# Patient Record
Sex: FEMALE | Race: WHITE | Employment: FULL TIME | URBAN - NONMETROPOLITAN AREA
[De-identification: names, ages, dates, MRNs, and addresses within clinical notes are randomized per-mention and may not be internally consistent; named-entity substitution may affect disease eponyms.]

---

## 2017-01-13 ENCOUNTER — AMBULATORY - GICH (OUTPATIENT)
Dept: RADIOLOGY | Facility: OTHER | Age: 32
End: 2017-01-13

## 2017-01-13 DIAGNOSIS — R92.8 OTHER ABNORMAL AND INCONCLUSIVE FINDINGS ON DIAGNOSTIC IMAGING OF BREAST: ICD-10-CM

## 2017-02-23 ENCOUNTER — HISTORY (OUTPATIENT)
Dept: RADIOLOGY | Facility: OTHER | Age: 32
End: 2017-02-23

## 2017-02-23 ENCOUNTER — HOSPITAL ENCOUNTER (OUTPATIENT)
Dept: RADIOLOGY | Facility: OTHER | Age: 32
End: 2017-02-23
Attending: FAMILY MEDICINE

## 2017-02-23 DIAGNOSIS — R92.8 OTHER ABNORMAL AND INCONCLUSIVE FINDINGS ON DIAGNOSTIC IMAGING OF BREAST: ICD-10-CM

## 2017-06-22 ENCOUNTER — AMBULATORY - GICH (OUTPATIENT)
Dept: RADIOLOGY | Facility: OTHER | Age: 32
End: 2017-06-22

## 2017-06-22 DIAGNOSIS — R92.8 OTHER ABNORMAL AND INCONCLUSIVE FINDINGS ON DIAGNOSTIC IMAGING OF BREAST: ICD-10-CM

## 2017-06-22 DIAGNOSIS — Z98.890 OTHER SPECIFIED POSTPROCEDURAL STATES: ICD-10-CM

## 2017-08-16 ENCOUNTER — HISTORY (OUTPATIENT)
Dept: RADIOLOGY | Facility: OTHER | Age: 32
End: 2017-08-16

## 2017-08-16 ENCOUNTER — HOSPITAL ENCOUNTER (OUTPATIENT)
Dept: RADIOLOGY | Facility: OTHER | Age: 32
End: 2017-08-16
Attending: FAMILY MEDICINE

## 2017-08-16 DIAGNOSIS — Z98.890 OTHER SPECIFIED POSTPROCEDURAL STATES: ICD-10-CM

## 2017-08-16 DIAGNOSIS — R92.8 OTHER ABNORMAL AND INCONCLUSIVE FINDINGS ON DIAGNOSTIC IMAGING OF BREAST: ICD-10-CM

## 2017-09-02 ENCOUNTER — COMMUNICATION - GICH (OUTPATIENT)
Dept: FAMILY MEDICINE | Facility: OTHER | Age: 32
End: 2017-09-02

## 2017-09-02 DIAGNOSIS — Z30.09 ENCOUNTER FOR OTHER GENERAL COUNSELING AND ADVICE ON CONTRACEPTION: ICD-10-CM

## 2017-12-17 ENCOUNTER — HEALTH MAINTENANCE LETTER (OUTPATIENT)
Age: 32
End: 2017-12-17

## 2017-12-28 NOTE — PROGRESS NOTES
Patient Information     Patient Name MRN Sex Amanda Noble [id number] Female 1985      Progress Notes by Sandrine Orozco at 2017  9:31 AM     Author:  Sandrine Orozco Service:  (none) Author Type:  (none)     Filed:  2017  9:31 AM Date of Service:  2017  9:31 AM Status:  Signed     :  Sandrine Orozco            Falls Risk Criteria:    Age 65 and older or under age 4        Sensory deficits    Poor vision    Use of ambulatory aides    Impaired judgment    Unable to walk independently    Meets High Risk criteria for falls:  no

## 2017-12-28 NOTE — TELEPHONE ENCOUNTER
Patient Information     Patient Name MRN Sex Amanda Noble [id number] Female 1985      Telephone Encounter by Mile Tirado RN at 2017  9:53 AM     Author:  Mile Tirado RN Service:  (none) Author Type:  NURS- Registered Nurse     Filed:  2017  9:57 AM Encounter Date:  2017 Status:  Signed     :  Mile Tirado RN (NURS- Registered Nurse)            Hormones  Office visit in the past 12 months or per provider note.  Last visit with RIDDHI SALVADOR was on: 2016 in [facility]  Next visit with RIDDHI SALVADOR is on: No future appointment listed with this provider  Next visit with Family Practice is on: No future appointment listed in this department  Max refill for 12 months from last office visit or per provider note.  Due for exam.  Limited refill per protocol and letter mailed.  Mile Tirado RN ........   2017    9:55 AM

## 2018-01-03 NOTE — PROGRESS NOTES
Patient Information     Patient Name MRN Sex Amanda Noble [id number] Female 1985      Progress Notes by Gisella Aranda R.T. ([facility]) at 2017  8:27 AM     Author:  Gisella Aranda R.T. ([facility]) Service:  (none) Author Type:  (none)     Filed:  2017  8:27 AM Date of Service:  2017  8:27 AM Status:  Signed     :  Gisella Aranda R.T. ([facility]) ([facility] - Registered Radiologic Technologist)            Falls Risk Criteria:    Age 65 and older or under age 4        Sensory deficits    Poor vision    Use of ambulatory aides    Impaired judgment    Unable to walk independently    Meets High Risk criteria for falls:  no

## 2018-01-30 ENCOUNTER — DOCUMENTATION ONLY (OUTPATIENT)
Dept: FAMILY MEDICINE | Facility: OTHER | Age: 33
End: 2018-01-30

## 2018-01-30 RX ORDER — NORGESTIMATE AND ETHINYL ESTRADIOL 7DAYSX3 28
1 KIT ORAL DAILY
COMMUNITY
Start: 2017-09-06 | End: 2018-08-20

## 2018-07-23 NOTE — PROGRESS NOTES
Patient Information     Patient Name  Amanda Lockett MRN  [id number] Sex  Female   1985      Letter by Kwan Verdin MD at      Author:  Kwan Verdin MD Service:  (none) Author Type:  (none)    Filed:   Date of Service:   Status:  (Other)       [facility]  [address]  [facility] [address]  [phone]         Amanda Lockett   [address] [address]  [facility] [address]      2017  Date of Breast Imagin2017  8:42 AM    Dear Ms. Lockett:    Your recent breast imaging examination showed an area that we believe is probably benign (probably not cancer). However, in 6 month(s), you should have a follow-up breast imaging study to confirm that this area has not changed. Please call [phone] to schedule an appointment for these tests if you have not already done so.    A report of your results was sent to your health care provider(s).    Your mammogram shows that your breast tissue is dense.  Dense breast tissue is relatively common and is found in more than 40 percent of women.  However, dense breast tissue may make it harder to identify cancer through a mammogram.  It may also be related to an increased risk of breast cancer.    The results of your mammogram are given to you and your primary care provider.  This information will raise your own awareness and help you talk with your primary care provider about your screening options.  Together you can decide which options are right for you based on your mammogram results, risk factors or physical exam.    Your images will become part of your medical file here at [facility] and will be available for your continuing care. You are responsible for informing any new health care provider or breast imaging facility of the date and location of this examination.    Although mammography is the most accurate method for early detection, not all cancers are found through mammography. If you notice any new  changes in your breast(s) please inform your health care provider without delay.    Thank you for choosing [facility] to participate in your healthcare needs.         [facility] Recommendations for Early Breast Cancer Detection   in Women without Symptoms  When to start having mammograms to screen for breast cancer, and how often to have them, is a personal decision. It should be based on your preferences, your values and your risk for developing breast cancer. [facility] recommends that you and your health care provider together determine when mammograms are right for you.    [facility] recommends the following guidelines for women who have an average risk for breast cancer, based on American Cancer Society guidelines:    Age 40 to 44: Mammograms are optional.     Age 45 to 54: Have a mammogram every year.           Age 55 and older: Have a mammogram every year, or transition to having one every 2 years. Continue to have mammograms as long as your health is good.    If you have a higher than average risk for breast cancer, your health care provider may recommend a different schedule.

## 2018-07-23 NOTE — PROGRESS NOTES
Patient Information     Patient Name  Amanda Lockett MRN  [id number] Sex  Female   1985      Letter by Anitha Dumas at      Author:  Anitha Dumas Service:  (none) Author Type:  (none)    Filed:   Date of Service:   Status:  (Other)       [facility]  [address] [facility] [address]  [phone]                 Amanda Lockett  [address] [address] [address]              2017    Dear Ms. Lockett:    It is time to schedule your follow up breast imaging.  Please call [phone] to schedule an appointment for this test if you have not already done so.    Thank you for choosing [facility] to participate in your healthcare needs.     Sincerely,    mammography    [facility]                             [facility] Recommendations for Early Breast Cancer Detection   in Women without Symptoms  Ages 40-49: encouraged to have a screening mammogram every year or talk with your health care provider  Ages 50-74: should have a screening mammogram every year  Ages 75 and older: talk with your health care provider about how often to schedule a mammogram

## 2018-07-23 NOTE — PROGRESS NOTES
Patient Information     Patient Name  Amanda Lockett MRN  [id number] Sex  Female   1985      Letter by Nehemias Cole MD at      Author:  Nehemias Cole MD Service:  (none) Author Type:  (none)    Filed:   Date of Service:   Status:  (Other)       [facility]  [address]  [facility] [address]  [phone]         Amanda Lockett   [address] [address] [address]      2017  Date of Breast Imagin2017  9:46 AM    Dear Ms. Lockett:    We are pleased to inform you that the result of your recent breast imaging examination is normal/benign (not cancer).    A report of your results was sent to your health care provider(s). Your mammogram shows that your breast tissue is dense.  Dense breast tissue is relatively common and is found in more than 40 percent of women.  However, dense breast tissue may make it harder to identify cancer through a mammogram.  It may also be related to an increased risk of breast cancer.    The results of your mammogram are given to you and your primary care provider.  This information will raise your own awareness and help you talk with your primary care provider about your screening options.  Together you can decide which options are right for you based on your mammogram results, risk factors or physical exam.    Your images will become part of your medical file here at [facility] and will be available for your continuing care. You are responsible for informing any new health care provider or breast imaging facility of the date and location of this examination.    Although mammography is the most accurate method for early detection, not all cancers are found through mammography. If you notice any new changes in your breast(s) please inform your health care provider without delay.    Thank you for choosing [facility] to participate in your healthcare needs.         [facility] Recommendations for Early Breast Cancer Detection   in Women without Symptoms  When to start having mammograms to screen for breast cancer, and how often to have them, is a personal decision. It should be based on your preferences, your values and your risk for developing breast cancer. [facility] recommends that you and your health care provider together determine when mammograms are right for you.    [facility] recommends the following guidelines for women who have an average risk for breast cancer, based on American Cancer Society guidelines:    Age 40 to 44: Mammograms are optional.     Age 45 to 54: Have a mammogram every year.           Age 55 and older: Have a mammogram every year, or transition to having one every 2 years. Continue to have mammograms as long as your health is good.    If you have a higher than average risk for breast cancer, your health care provider may recommend a different schedule.

## 2018-07-24 NOTE — PROGRESS NOTES
Patient Information     Patient Name  Amanda Lockett MRN  [id number] Sex  Female   1985      Letter by Eugenia Araujo MD at      Author:  Eugenia Araujo MD Service:  (none) Author Type:  (none)    Filed:   Encounter Date:  2017 Status:  (Other)           Amanda Lockett  [address] [address]  [facility] [address]          2017    Dear Ms. Lockett:    A 90 day refill of Norestimate-ethinyl estradiol (ORTHO TRI-CYCLEN) 0.18/0.215/0.25 mg-35 mcg (28) tablet has been called into your pharmacy.    Additional refills require an annual office visit with Eugenia Araujo MD.   Please call the clinic at [phone] to schedule your appointment.    If you should require additional refills before your scheduled appointment, please contact your pharmacy and we will refill your medication until that date.      Thank you,    The Refill Nurse  [facility]

## 2018-08-20 ENCOUNTER — OFFICE VISIT (OUTPATIENT)
Dept: FAMILY MEDICINE | Facility: OTHER | Age: 33
End: 2018-08-20
Attending: FAMILY MEDICINE
Payer: COMMERCIAL

## 2018-08-20 VITALS
HEART RATE: 84 BPM | BODY MASS INDEX: 23.49 KG/M2 | WEIGHT: 141 LBS | DIASTOLIC BLOOD PRESSURE: 70 MMHG | HEIGHT: 65 IN | SYSTOLIC BLOOD PRESSURE: 128 MMHG

## 2018-08-20 DIAGNOSIS — N91.2 AMENORRHEA: Primary | ICD-10-CM

## 2018-08-20 DIAGNOSIS — Z34.80 PRENATAL CARE OF MULTIGRAVIDA, ANTEPARTUM: ICD-10-CM

## 2018-08-20 LAB — HCG UR QL: POSITIVE

## 2018-08-20 PROCEDURE — 81025 URINE PREGNANCY TEST: CPT | Performed by: FAMILY MEDICINE

## 2018-08-20 PROCEDURE — 99213 OFFICE O/P EST LOW 20 MIN: CPT | Performed by: FAMILY MEDICINE

## 2018-08-20 ASSESSMENT — PAIN SCALES - GENERAL: PAINLEVEL: NO PAIN (0)

## 2018-08-20 ASSESSMENT — ENCOUNTER SYMPTOMS
FREQUENCY: 0
FATIGUE: 0

## 2018-08-20 NOTE — PROGRESS NOTES
SUBJECTIVE:   Nursing Notes:   Elisabeth Crow LPN  2018  4:22 PM  Signed  Patient is here for her amenorrhea, she wants to check to see if she is pregnant.  She did have a positive home pregnancy test.    Elisabeth Crow LPN .......2018 4:01 PM     Amanda Lockett is a 33 year old female who presents to clinic today for amenorrhea.  She was not trying to get pregnant, this is a surprise.  She had a positive pregnancy test at home.  She thinks her LMP date was 18.  No nausea/vomiting.  No significant fatigue.  A little breast tenderness.  On a prenatal vitamin already.    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       Encounter for counseling regarding contraception 2011     Priority: Medium     Contact dermatitis 2011     Priority: Medium     Overview:   Secondary to chloro-prep       Past Medical History:   Diagnosis Date     Benign neoplasm of right breast     2016,Proven by core needle biopsy 2016.     Personal history of other diseases of the female genital tract     G3, , s/p VAVD (baby weighed 8#15 oz);  for transverse presentation and fetal macrosomia (weight 9 lbs. 2 oz.), elective repeat       Past Surgical History:   Procedure Laterality Date      SECTION      09/15/2011, for breech presentation      SECTION      2015,Elective repeat      MYRINGOTOMY, INSERT TUBE, COMBINED      ,PE tubes x 3, last placement when she was in 3rd grade     OTHER SURGICAL HISTORY      ,36703,ORAL SURGERY     Family History   Problem Relation Age of Onset     Thyroid Disease Mother      Thyroid Disease,hypothyroid     Hypertension Mother      Hypertension     Breast Cancer Paternal Grandfather       "Cancer-breast,breast cancer in 70s;  of different cancer     Cancer Maternal Grandfather      Cancer,prostate cancer     Other - See Comments Other      No family history of anesthesia intolerance or bleeding/clotting disorders     Breast Cancer Maternal Aunt      Cancer-breast     Breast Cancer Paternal Aunt      Cancer-breast     Colon Cancer No family hx of      Cancer-colon     Ovarian Cancer No family hx of      Cancer-ovarian     Pancreatic Cancer No family hx of      Cancer-pancreatic     Social History   Substance Use Topics     Smoking status: Never Smoker     Smokeless tobacco: Never Used     Alcohol use No     Social History     Social History Narrative    Graduated from [employer] in [place] in 2006 with degree in business.  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   to Howard Lockett; he works for [employer].        Chris Lockett - Son - 10/20/09;     Randy Lockett - son - 9/15/11;     Alphonse Lockett - son - 1/23/15.      Lydia Rodriguez - Mother.     No current outpatient prescriptions on file.     Allergies   Allergen Reactions     Chlorhexidine Rash     Chloraprep used prior to Day Surgery       Review of Systems   Constitutional: Negative for fatigue.   Breasts:  Positive for tenderness.   Genitourinary: Negative for frequency.   Psychiatric/Behavioral: Negative for mood changes.        OBJECTIVE:     /70 (BP Location: Right arm, Patient Position: Sitting, Cuff Size: Adult Regular)  Pulse 84  Ht 5' 5\" (1.651 m)  Wt 141 lb (64 kg)  LMP 2018 (Within Days)  BMI 23.46 kg/m2  Body mass index is 23.46 kg/(m^2).  Physical Exam   Constitutional: She appears well-developed.   HENT:   Head: Normocephalic.   Eyes: Pupils are equal, round, and reactive to light.   Neck: Normal range of motion. Neck supple. No thyromegaly present.   Cardiovascular: Normal rate, regular rhythm and normal heart sounds.    No murmur heard.  Pulmonary/Chest: Effort normal and " breath sounds normal. No respiratory distress. She has no wheezes. She has no rales.   Lymphadenopathy:     She has no cervical adenopathy.   Psychiatric: She has a normal mood and affect.         I personally reviewed results withpatient as listed below:   Diagnostic Test Results:  Results for orders placed or performed in visit on 08/20/18 (from the past 24 hour(s))   Pregnancy, Urine (HCG)   Result Value Ref Range    HCG Qual Urine Positive (A) NEG^Negative       ASSESSMENT/PLAN:       ICD-10-CM    1. Amenorrhea N91.2 Pregnancy, Urine (HCG)   2. Prenatal care of multigravida, antepartum Z34.80 US OB < 14 Weeks Single       1.  Pregnancy confirmed as above.  Will obtain OB ultrasound to confirm dates.  Follow-up in the next few weeks for OB physical.  Follow-up sooner if any other concerns develop.  2.  See #1.    Eugenia Araujo MD  [facility]

## 2018-08-20 NOTE — NURSING NOTE
Patient is here for her amenorrhea, she wants to check to see if she is pregnant.  She did have a positive home pregnancy test.    Elisabeth Crow LPN .......8/20/2018 4:01 PM

## 2018-08-20 NOTE — MR AVS SNAPSHOT
"              After Visit Summary   8/20/2018    Amanda Lockett    MRN: [id number]           Patient Information     Date Of Birth          1985        Visit Information        Provider Department      8/20/2018 4:00 PM Eugenia Araujo MD [facility]        Today's Diagnoses     Amenorrhea    -  1    Prenatal care of multigravida, antepartum           Follow-ups after your visit        Future tests that were ordered for you today     Open Future Orders        Priority Expected Expires Ordered    US OB < 14 Weeks Single Routine  8/20/2019 8/20/2018            Who to contact     If you have questions or need follow up information about today's clinic visit or your schedule please contact [facility] directly at [phone].  Normal or non-critical lab and imaging results will be communicated to you by MyChart, letter or phone within 4 business days after the clinic has received the results. If you do not hear from us within 7 days, please contact the clinic through MyChart or phone. If you have a critical or abnormal lab result, we will notify you by phone as soon as possible.  Submit refill requests through [facility] or call your pharmacy and they will forward the refill request to us. Please allow 3 business days for your refill to be completed.          Additional Information About Your Visit        Care EveryWhere ID     This is your Care EveryWhere ID. This could be used by other organizations to access your [place] medical records  [id number]        Your Vitals Were     Pulse Height Last Period BMI (Body Mass Index)          84 5' 5\" (1.651 m) 07/05/2018 (Within Days) 23.46 kg/m2         Blood Pressure from Last 3 Encounters:   08/20/18 128/70   08/19/16 130/84   08/09/16 120/86    Weight from Last 3 Encounters:   08/20/18 141 lb (64 kg)   08/19/16 138 lb 12.8 oz (63 kg)   08/09/16 140 lb 9.6 oz (63.8 kg)              We Performed the Following     " Pregnancy, Urine (HCG)        Primary Care Provider Office Phone # Fax #    Eugenia Annie Araujo [phone] [address] [facility] [address]        Equal Access to Services     FIDELINA MURCIA : Hadii aad ku hadrainsue Sosukhwinderali, waaxda luqadaha, qaybta kaalmada adefilippoda, janeth aidan tatymayco kimjose robertodominique gray. So [facility] [phone].    ATENCIÓN: Si habla español, tiene a de jesus disposición servicios gratuitos de asistencia lingüística. Llame al [phone].    We comply with applicable federal civil rights laws and [place] laws. We do not discriminate on the basis of race, color, national origin, age, disability, sex, sexual orientation, or gender identity.            Thank you!     Thank you for choosing [facility]  for your care. Our goal is always to provide you with excellent care. Hearing back from our patients is one way we can continue to improve our services. Please take a few minutes to complete the written survey that you may receive in the mail after your visit with us. Thank you!             Your Updated Medication List - Protect others around you: Learn how to safely use, store and throw away your medicines at www.disposemymeds.org.      Notice  As of 8/20/2018  4:53 PM    You have not been prescribed any medications.

## 2018-09-05 ENCOUNTER — HOSPITAL ENCOUNTER (OUTPATIENT)
Dept: ULTRASOUND IMAGING | Facility: OTHER | Age: 33
Discharge: HOME | End: 2018-09-05
Attending: FAMILY MEDICINE | Admitting: FAMILY MEDICINE
Payer: COMMERCIAL

## 2018-09-05 DIAGNOSIS — Z34.80 PRENATAL CARE OF MULTIGRAVIDA, ANTEPARTUM: ICD-10-CM

## 2018-09-05 PROCEDURE — 76801 OB US < 14 WKS SINGLE FETUS: CPT

## 2018-09-06 ENCOUNTER — EXTERNAL ORDER RESULTS (OUTPATIENT)
Dept: FAMILY MEDICINE | Facility: OTHER | Age: 33
End: 2018-09-06

## 2018-09-06 DIAGNOSIS — O02.1 MISSED AB: Primary | ICD-10-CM

## 2018-09-07 ENCOUNTER — OFFICE VISIT (OUTPATIENT)
Dept: OBGYN | Facility: OTHER | Age: 33
End: 2018-09-07
Attending: OBSTETRICS & GYNECOLOGY
Payer: COMMERCIAL

## 2018-09-07 VITALS
HEART RATE: 80 BPM | WEIGHT: 142 LBS | DIASTOLIC BLOOD PRESSURE: 76 MMHG | BODY MASS INDEX: 23.63 KG/M2 | SYSTOLIC BLOOD PRESSURE: 122 MMHG

## 2018-09-07 DIAGNOSIS — O02.1 MISSED AB: ICD-10-CM

## 2018-09-07 PROCEDURE — 99203 OFFICE O/P NEW LOW 30 MIN: CPT | Performed by: OBSTETRICS & GYNECOLOGY

## 2018-09-07 ASSESSMENT — PAIN SCALES - GENERAL: PAINLEVEL: NO PAIN (0)

## 2018-09-07 NOTE — MR AVS SNAPSHOT
"              After Visit Summary   9/7/2018    Amanda Lockett    MRN: [id number]           Patient Information     Date Of Birth          1985        Visit Information        Provider Department      9/7/2018 3:00 PM Shady Wells MD [facility]        Today's Diagnoses     Missed ab           Follow-ups after your visit        Your next 10 appointments already scheduled     Sep 20, 2018  3:00 PM CDT   New Prenatal with Eugenia Araujo MD   [facility] ([facility])    [address]  [place] [id number] [phone]              Who to contact     If you have questions or need follow up information about today's clinic visit or your schedule please contact [facility] [place] directly at [phone].  Normal or non-critical lab and imaging results will be communicated to you by [employer], letter or phone within 4 business days after the clinic has received the results. If you do not hear from us within 7 days, please contact the clinic through [employer] or phone. If you have a critical or abnormal lab result, we will notify you by phone as soon as possible.  Submit refill requests through [employer] or call your pharmacy and they will forward the refill request to us. Please allow 3 business days for your refill to be completed.          Additional Information About Your Visit        MyChart Information     [employer] lets you send messages to your doctor, view your test results, renew your prescriptions, schedule appointments and more. To sign up, go to [email or web address] . Click on \"Log in\" on the left side of the screen, which will take you to the Welcome page. Then click on \"Sign up Now\" on the right side of the page.     You will be asked to enter the access code listed below, as well as some personal information. Please follow the directions to create your username and password.     Your access code " HPI:    Patient ID: Jose Walker is a 72year old female. Using CPAP X 3 weeks. Feels better. Back Pain   This is a new problem. The current episode started more than 1 month ago (X 3 months.). The problem occurs intermittently.  The problem is u Patient is here for follow up of hypertension. BP at home: not check. Has been compliant with medications. Exercise level: trying to do more and has not been following low salt diet. Weight has been stable.   Wt Readings from Last 3 Encounters:  05/18/1 is: [email or web address]  Expires: 2018  3:13 PM     Your access code will  in 90 days. If you need help or a new code, please call your [facility] clinic or [phone].        Care EveryWhere ID     This is your Care EveryWhere ID. This could be used by other organizations to access your [facility] medical records  [id number]        Your Vitals Were     Pulse Last Period Breastfeeding? BMI (Body Mass Index)          80 2018 (Within Days) No 23.63 kg/m2         Blood Pressure from Last 3 Encounters:   18 122/76   18 128/70   16 130/84    Weight from Last 3 Encounters:   18 64.4 kg (142 lb)   18 64 kg (141 lb)   16 63 kg (138 lb 12.8 oz)              Today, you had the following     No orders found for display       Primary Care Provider Office Phone # Fax #    Eugenia Annie Araujo [phone]       [address] [facility] [address]        Equal Access to Services     [facility]: Hadii raysa Mcneal, wayelenada shana, qaybta gena lopez, janeth garcia . So [facility] [place] [phone].    ATENCIÓN: Si habla español, tiene a de jesus disposición servicios gratuitos de asistencia lingüística. Ll[facility] [phone].    We comply with applicable federal civil rights laws and [place] laws. We do not discriminate on the basis of race, color, national origin, age, disability, sex, sexual orientation, or gender identity.            Thank you!     Thank you for choosing [facility] AND \[facility]\""  for your care. Our goal is always to provide you with excellent care. Hearing back from our patients is one way we can continue to improve our services. Please take a few minutes to complete the written survey that you may receive in the mail after your visit with us. Thank you!             Your Updated Medication List - Protect others around you: Learn how to safely use, store and throw away your medicines at  Neurological: Negative for dizziness, tingling, tremors, seizures, syncope, weakness, light-headedness, numbness, headaches and paresthesias. All other systems reviewed and are negative.           Current Outpatient Prescriptions:  Amitriptyline HCl 10 MG [email or web address].      Notice  As of 9/7/2018  5:03 PM    You have not been prescribed any medications.       Mouth/Throat: Uvula is midline, oropharynx is clear and moist and mucous membranes are normal. No oropharyngeal exudate. Eyes: Conjunctivae are normal. No scleral icterus.    Neck: Trachea normal and phonation normal. No thyroid mass and no thyromegaly pr Left ankle: She exhibits normal range of motion, no swelling, no ecchymosis, no deformity and no laceration. No tenderness. Achilles tendon normal. Achilles tendon exhibits no pain, no defect and normal Gonzalez's test results.         Lumbar back: She

## 2018-09-07 NOTE — NURSING NOTE
"Chief Complaint   Patient presents with     Miscarriage     Miscarriage       Patient presents for miscarriage which she found out after her recent US.  She is not passing any blood or clots.   Carly Ariza LPN........................9/7/2018  3:08 PM     Initial LMP 07/05/2018 (Within Days) Estimated body mass index is 23.46 kg/(m^2) as calculated from the following:    Height as of 8/20/18: 1.651 m (5' 5\").    Weight as of 8/20/18: 64 kg (141 lb).  Medication Reconciliation: complete    Carly Ariza LPN  "

## 2018-09-07 NOTE — PROGRESS NOTES
Amadna is a very pleasant 33-year-old  4 para 3 who is here with her  Howard and referred by Dr. Car Bello for discussion of a missed AB.    Patient has 3 children, all boys.  Ages are 8, 6, 3.  The first one was a vaginal delivery the last 2  section for breech.    Had an ultrasound this week for dating and was noted to have a nonviable fetus at 7+ weeks gestation.  Was about 9 weeks by dates.  Denies any bleeding or cramping at this time.    Patient is otherwise healthy.  Has no ongoing medical issues.    Medications none    Rh +    Allergies chlorhexidine    Tobacco negative    Social history  works at [employer]    Physicalexam limited to vitals: Patient alert orientated ×3 appears in no distress blood pressure 122/76 pulse 80 weight is 142    Assessment:   Missed AB at 7-9 weeks.    Plan:   Miscarriage discussed in detail.  Options reviewed including expectant management, medical or surgical intervention and the combined option of expectant management with a planned intervention down the road.    Following her discussion Amanda in nature are leaning towards expectant management for at least a week with the intent of proceeding with a D&C if spontaneous miscarriage has not occurred.  All questions were answered to their satisfaction.    30+ minutes spent, all in counseling time

## 2018-09-19 ENCOUNTER — MYC MEDICAL ADVICE (OUTPATIENT)
Dept: FAMILY MEDICINE | Facility: OTHER | Age: 33
End: 2018-09-19

## 2018-09-19 DIAGNOSIS — O02.1 MISSED AB: Primary | ICD-10-CM

## 2018-09-20 DIAGNOSIS — O02.1 MISSED AB: Primary | ICD-10-CM

## 2018-09-20 DIAGNOSIS — O02.1 MISSED AB: ICD-10-CM

## 2018-09-20 LAB
B-HCG SERPL-ACNC: NORMAL IU/L
HCG SERPL QL: POSITIVE

## 2018-09-20 PROCEDURE — 84702 CHORIONIC GONADOTROPIN TEST: CPT | Performed by: FAMILY MEDICINE

## 2018-09-20 PROCEDURE — 36415 COLL VENOUS BLD VENIPUNCTURE: CPT | Performed by: FAMILY MEDICINE

## 2018-09-20 PROCEDURE — 84703 CHORIONIC GONADOTROPIN ASSAY: CPT | Performed by: FAMILY MEDICINE

## 2018-09-20 NOTE — TELEPHONE ENCOUNTER
Please call - I will order a beta HCG for her, but without having a previous point of reference it might be hard to know if this level is dropping or not.  Until the miscarriage passes, her HCG level might not drop substantially.  She may come for a lab visit if she would like to have this drawn.  I am not in clinic tomorrow, so make sure to let her know to call to get results.  Since it has been 2 weeks at this point, I would recommend that she consider intervention to try to avoid risk of infection.  I will forward this message on to Dr. Shady Wells as well.  Eugenia Araujo MD on 9/20/2018 at 12:53 PM

## 2018-09-20 NOTE — PROGRESS NOTES
This was to be a Quant HCG.  Called lab, this is going to be run.  Eugenia Araujo MD on 9/20/2018 at 6:01 PM

## 2018-09-24 ENCOUNTER — TELEPHONE (OUTPATIENT)
Dept: OBGYN | Facility: OTHER | Age: 33
End: 2018-09-24

## 2018-09-24 ENCOUNTER — HOSPITAL ENCOUNTER (OUTPATIENT)
Dept: ULTRASOUND IMAGING | Facility: OTHER | Age: 33
Discharge: HOME | End: 2018-09-24
Attending: FAMILY MEDICINE | Admitting: FAMILY MEDICINE
Payer: COMMERCIAL

## 2018-09-24 DIAGNOSIS — O02.1 MISSED AB: ICD-10-CM

## 2018-09-24 PROCEDURE — 76801 OB US < 14 WKS SINGLE FETUS: CPT

## 2018-09-24 NOTE — TELEPHONE ENCOUNTER
I received a phone call from Dr. Car Bello today.  Amanda, who I had seen about 3 weeks ago in consult, has not completed her miscarriage.  She is desirous of proceeding with a next step in the near future.    I personally spoke to Amanda.  We again reviewed options.  She would like to consider a suction D&C would like to wait until next week to proceed.  We reviewed the procedure risks benefits convalescence.  All questions were answered.    My note from 9/7/2018 will serve as H.  P.  I reviewed with patient by phone that there are no changes to her medical history in the interim.    Healthy    Allergies chlorhexidine    Rh+    Social history: , works at [employer]    Family history: Noncontributory    General, chest, cardiac, abdomen,  review of systems as noted above otherwise negative    Heart and lung to be updated at day of surgery

## 2018-09-25 ENCOUNTER — ALLIED HEALTH/NURSE VISIT (OUTPATIENT)
Dept: OBGYN | Facility: OTHER | Age: 33
End: 2018-09-25
Attending: OBSTETRICS & GYNECOLOGY
Payer: COMMERCIAL

## 2018-09-25 DIAGNOSIS — O02.1 MISSED ABORTION: Primary | ICD-10-CM

## 2018-09-25 NOTE — MR AVS SNAPSHOT
After Visit Summary   2018    Amanda Lockett    MRN: [id number]           Patient Information     Date Of Birth          1985        Visit Information        Provider Department      2018 11:00 AM Nurse, [facility]        Today's Diagnoses     Missed     -  1       Follow-ups after your visit        Your next 10 appointments already scheduled     Oct 16, 2018  2:45 PM CDT   SHORT with Shady Wells MD   [facility] ([facility])    [address]  [place] [address] [phone]              Future tests that were ordered for you today     Open Future Orders        Priority Expected Expires Ordered    US OB < 14 Weeks Single Routine  2019            Who to contact     If you have questions or need follow up information about today's clinic visit or your schedule please contact [facility] directly at [phone].  Normal or non-critical lab and imaging results will be communicated to you by [employer], letter or phone within 4 business days after the clinic has received the results. If you do not hear from us within 7 days, please contact the clinic through [employer] or phone. If you have a critical or abnormal lab result, we will notify you by phone as soon as possible.  Submit refill requests through [employer] or call your pharmacy and they will forward the refill request to us. Please allow 3 business days for your refill to be completed.          Additional Information About Your Visit        [employer] Information     [employer] gives you secure access to your electronic health record. If you see a primary care provider, you can also send messages to your care team and make appointments. If you have questions, please call your primary care clinic.  If you do not have a primary care provider, please call [phone] and they will assist you.        Care  EveryWhere ID     This is your Care EveryWhere ID. This could be used by other organizations to access your [facility] medical records  [id number]         Blood Pressure from Last 3 Encounters:   09/07/18 122/76   08/20/18 128/70   08/19/16 130/84    Weight from Last 3 Encounters:   09/07/18 64.4 kg (142 lb)   08/20/18 64 kg (141 lb)   08/19/16 63 kg (138 lb 12.8 oz)              Today, you had the following     No orders found for display       Primary Care Provider Office Phone # Fax #    Eugenia Annie Araujo [phone]       [address]  [facility] [address]        Equal Access to Services     [facility] : Hadii raysa Mcneal, wayelenada shana, qaybta kaalmada john, janeth garcia . So [facility] [phone].    ATENCIÓN: Si habla español, tiene a de jesus disposición servicios gratuitos de asistencia lingüística. Llame al [phone].    We comply with applicable federal civil rights laws and [place] laws. We do not discriminate on the basis of race, color, national origin, age, disability, sex, sexual orientation, or gender identity.            Thank you!     Thank you for choosing [facility]  for your care. Our goal is always to provide you with excellent care. Hearing back from our patients is one way we can continue to improve our services. Please take a few minutes to complete the written survey that you may receive in the mail after your visit with us. Thank you!             Your Updated Medication List - Protect others around you: Learn how to safely use, store and throw away your medicines at www.disposemymeds.org.      Notice  As of 9/25/2018 11:27 AM    You have not been prescribed any medications.

## 2018-09-25 NOTE — NURSING NOTE
"Date of Surgery: 10/1/18  Type of Surgery: Suction Dilation & Curettage  Surgeon: Dr. Shady Wells MD    Patient's consents were signed and appropriate appointments were scheduled by the [facility] . Patient was given surgical folder. Surgical forms were faxed to [phone] and [phone], copies made and kept for informative purposes, and originals were delivered to Day-surgery. Patient denies questions at this time.        STOP BANG    Fever/Chills or other infectious symptoms in past month? no  >10 pound weight loss in the past 2 months? no  Health Care Directive on file? no  History of blood transfusions? no  Td up to date? yes  History of VRE/MRSA? no      Obstructive Sleep Apnea screening    Preoperative Evaluation: Obstructive Sleep Apnea screening    S: Snore -  Do you snore loudly? (louder than talking or loud enough to be heard through closed doors)no  T: Tired - Do you often feel tired, fatigued, or sleepy during the daytime?no  O: Observed - Has anyone ever observed you stop breathing during your sleep?no  P: Pressure - Do you have or are you being treated for high blood pressure?no  B: BMI - BMI greater than 35kg/m2?no  A: Age - Age over 50 years old?no  N: Neck - Neck circumference greater than 40 cm?no  G: Gender - Gender: Male?no    Total number of \"YES\" responses:  0    Scoring: Low risk of ROSA 0-2  At Risk of ROSA: >3 High Risk of ROSA: 5-8      Total yes answers in ROSA section:    Low risk 0-2  At risk 3-4  High risk 5-8    Antonia Montanez............. 9/25/2018 11:09 AM     "

## 2018-09-28 ENCOUNTER — OFFICE VISIT (OUTPATIENT)
Dept: OBGYN | Facility: OTHER | Age: 33
End: 2018-09-28
Attending: OBSTETRICS & GYNECOLOGY
Payer: COMMERCIAL

## 2018-09-28 ENCOUNTER — ANESTHESIA (OUTPATIENT)
Dept: SURGERY | Facility: OTHER | Age: 33
End: 2018-09-28
Payer: COMMERCIAL

## 2018-09-28 ENCOUNTER — ANESTHESIA EVENT (OUTPATIENT)
Dept: SURGERY | Facility: OTHER | Age: 33
End: 2018-09-28
Payer: COMMERCIAL

## 2018-09-28 ENCOUNTER — HOSPITAL ENCOUNTER (OUTPATIENT)
Facility: OTHER | Age: 33
Discharge: HOME | End: 2018-09-28
Attending: OBSTETRICS & GYNECOLOGY | Admitting: OBSTETRICS & GYNECOLOGY
Payer: COMMERCIAL

## 2018-09-28 ENCOUNTER — SURGERY (OUTPATIENT)
Age: 33
End: 2018-09-28

## 2018-09-28 VITALS
SYSTOLIC BLOOD PRESSURE: 118 MMHG | TEMPERATURE: 100.3 F | WEIGHT: 142 LBS | DIASTOLIC BLOOD PRESSURE: 70 MMHG | BODY MASS INDEX: 23.63 KG/M2 | HEART RATE: 104 BPM

## 2018-09-28 VITALS
OXYGEN SATURATION: 100 % | RESPIRATION RATE: 16 BRPM | DIASTOLIC BLOOD PRESSURE: 69 MMHG | TEMPERATURE: 97.4 F | SYSTOLIC BLOOD PRESSURE: 109 MMHG

## 2018-09-28 DIAGNOSIS — R50.9 FEVER, UNSPECIFIED FEVER CAUSE: ICD-10-CM

## 2018-09-28 DIAGNOSIS — O03.4 INCOMPLETE MISCARRIAGE: Primary | ICD-10-CM

## 2018-09-28 LAB
ALBUMIN UR-MCNC: 30 MG/DL
APPEARANCE UR: ABNORMAL
B-HCG SERPL-ACNC: 8876 IU/L
BACTERIA #/AREA URNS HPF: ABNORMAL /HPF
BASOPHILS # BLD AUTO: 0 10E9/L (ref 0–0.2)
BASOPHILS NFR BLD AUTO: 0.3 %
BILIRUB UR QL STRIP: ABNORMAL
COLOR UR AUTO: ABNORMAL
DIFFERENTIAL METHOD BLD: ABNORMAL
EOSINOPHIL # BLD AUTO: 0 10E9/L (ref 0–0.7)
EOSINOPHIL NFR BLD AUTO: 0.1 %
ERYTHROCYTE [DISTWIDTH] IN BLOOD BY AUTOMATED COUNT: 13.2 % (ref 10–15)
GLUCOSE UR STRIP-MCNC: NEGATIVE MG/DL
HCT VFR BLD AUTO: 39.4 % (ref 35–47)
HGB BLD-MCNC: 13.3 G/DL (ref 11.7–15.7)
HGB UR QL STRIP: ABNORMAL
IMM GRANULOCYTES # BLD: 0 10E9/L (ref 0–0.4)
IMM GRANULOCYTES NFR BLD: 0.3 %
KETONES UR STRIP-MCNC: >80 MG/DL
LEUKOCYTE ESTERASE UR QL STRIP: NEGATIVE
LYMPHOCYTES # BLD AUTO: 1.2 10E9/L (ref 0.8–5.3)
LYMPHOCYTES NFR BLD AUTO: 10 %
MCH RBC QN AUTO: 28.7 PG (ref 26.5–33)
MCHC RBC AUTO-ENTMCNC: 33.8 G/DL (ref 31.5–36.5)
MCV RBC AUTO: 85 FL (ref 78–100)
MONOCYTES # BLD AUTO: 1 10E9/L (ref 0–1.3)
MONOCYTES NFR BLD AUTO: 8.4 %
NEUTROPHILS # BLD AUTO: 9.5 10E9/L (ref 1.6–8.3)
NEUTROPHILS NFR BLD AUTO: 80.9 %
NITRATE UR QL: NEGATIVE
NON-SQ EPI CELLS #/AREA URNS LPF: ABNORMAL /LPF
PH UR STRIP: 6 PH (ref 5–9)
PLATELET # BLD AUTO: 229 10E9/L (ref 150–450)
RBC # BLD AUTO: 4.64 10E12/L (ref 3.8–5.2)
RBC #/AREA URNS AUTO: >100 /HPF
SOURCE: ABNORMAL
SP GR UR STRIP: >1.03 (ref 1–1.03)
UROBILINOGEN UR STRIP-ACNC: 0.2 EU/DL (ref 0.2–1)
WBC # BLD AUTO: 11.8 10E9/L (ref 4–11)
WBC #/AREA URNS AUTO: ABNORMAL /HPF

## 2018-09-28 PROCEDURE — 87070 CULTURE OTHR SPECIMN AEROBIC: CPT | Performed by: OBSTETRICS & GYNECOLOGY

## 2018-09-28 PROCEDURE — 37000009 ZZH ANESTHESIA TECHNICAL FEE, EACH ADDTL 15 MIN: Performed by: OBSTETRICS & GYNECOLOGY

## 2018-09-28 PROCEDURE — 36000050 ZZH SURGERY LEVEL 2 1ST 30 MIN: Performed by: OBSTETRICS & GYNECOLOGY

## 2018-09-28 PROCEDURE — 85025 COMPLETE CBC W/AUTO DIFF WBC: CPT | Performed by: OBSTETRICS & GYNECOLOGY

## 2018-09-28 PROCEDURE — 99213 OFFICE O/P EST LOW 20 MIN: CPT | Mod: 25 | Performed by: OBSTETRICS & GYNECOLOGY

## 2018-09-28 PROCEDURE — 84702 CHORIONIC GONADOTROPIN TEST: CPT | Performed by: OBSTETRICS & GYNECOLOGY

## 2018-09-28 PROCEDURE — 37000008 ZZH ANESTHESIA TECHNICAL FEE, 1ST 30 MIN: Performed by: OBSTETRICS & GYNECOLOGY

## 2018-09-28 PROCEDURE — 40000306 ZZH STATISTIC PRE PROC ASSESS II: Performed by: OBSTETRICS & GYNECOLOGY

## 2018-09-28 PROCEDURE — 25000566 ZZH SEVOFLURANE, EA 15 MIN: Performed by: OBSTETRICS & GYNECOLOGY

## 2018-09-28 PROCEDURE — 81001 URINALYSIS AUTO W/SCOPE: CPT | Performed by: OBSTETRICS & GYNECOLOGY

## 2018-09-28 PROCEDURE — 36415 COLL VENOUS BLD VENIPUNCTURE: CPT | Performed by: OBSTETRICS & GYNECOLOGY

## 2018-09-28 PROCEDURE — 25000132 ZZH RX MED GY IP 250 OP 250 PS 637: Performed by: OBSTETRICS & GYNECOLOGY

## 2018-09-28 PROCEDURE — 27210794 ZZH OR GENERAL SUPPLY STERILE: Performed by: OBSTETRICS & GYNECOLOGY

## 2018-09-28 PROCEDURE — G0378 HOSPITAL OBSERVATION PER HR: HCPCS

## 2018-09-28 PROCEDURE — 25000128 H RX IP 250 OP 636: Performed by: NURSE ANESTHETIST, CERTIFIED REGISTERED

## 2018-09-28 PROCEDURE — 88305 TISSUE EXAM BY PATHOLOGIST: CPT

## 2018-09-28 PROCEDURE — 87075 CULTR BACTERIA EXCEPT BLOOD: CPT | Performed by: OBSTETRICS & GYNECOLOGY

## 2018-09-28 PROCEDURE — 36000052 ZZH SURGERY LEVEL 2 EA 15 ADDTL MIN: Performed by: OBSTETRICS & GYNECOLOGY

## 2018-09-28 PROCEDURE — 59812 TREATMENT OF MISCARRIAGE: CPT | Performed by: OBSTETRICS & GYNECOLOGY

## 2018-09-28 PROCEDURE — 25000128 H RX IP 250 OP 636: Performed by: OBSTETRICS & GYNECOLOGY

## 2018-09-28 PROCEDURE — 71000014 ZZH RECOVERY PHASE 1 LEVEL 2 FIRST HR: Performed by: OBSTETRICS & GYNECOLOGY

## 2018-09-28 PROCEDURE — 71000027 ZZH RECOVERY PHASE 2 EACH 15 MINS: Performed by: OBSTETRICS & GYNECOLOGY

## 2018-09-28 RX ORDER — ONDANSETRON 2 MG/ML
INJECTION INTRAMUSCULAR; INTRAVENOUS PRN
Status: DISCONTINUED | OUTPATIENT
Start: 2018-09-28 | End: 2018-09-28

## 2018-09-28 RX ORDER — FENTANYL CITRATE 50 UG/ML
25-50 INJECTION, SOLUTION INTRAMUSCULAR; INTRAVENOUS
Status: DISCONTINUED | OUTPATIENT
Start: 2018-09-28 | End: 2018-09-28 | Stop reason: HOSPADM

## 2018-09-28 RX ORDER — CEFAZOLIN SODIUM 2 G/100ML
2 INJECTION, SOLUTION INTRAVENOUS
Status: CANCELLED | OUTPATIENT
Start: 2018-09-28

## 2018-09-28 RX ORDER — AMOXICILLIN AND CLAVULANATE POTASSIUM 500; 125 MG/1; MG/1
1 TABLET, FILM COATED ORAL 2 TIMES DAILY
Qty: 20 TABLET | Refills: 0 | Status: SHIPPED | OUTPATIENT
Start: 2018-09-28 | End: 2018-10-16

## 2018-09-28 RX ORDER — NALOXONE HYDROCHLORIDE 0.4 MG/ML
.1-.4 INJECTION, SOLUTION INTRAMUSCULAR; INTRAVENOUS; SUBCUTANEOUS
Status: DISCONTINUED | OUTPATIENT
Start: 2018-09-28 | End: 2018-09-28 | Stop reason: HOSPADM

## 2018-09-28 RX ORDER — ACETAMINOPHEN 325 MG/1
650 TABLET ORAL
Status: DISCONTINUED | OUTPATIENT
Start: 2018-09-28 | End: 2018-09-28 | Stop reason: HOSPADM

## 2018-09-28 RX ORDER — ONDANSETRON 4 MG/1
4 TABLET, ORALLY DISINTEGRATING ORAL EVERY 30 MIN PRN
Status: DISCONTINUED | OUTPATIENT
Start: 2018-09-28 | End: 2018-09-28 | Stop reason: HOSPADM

## 2018-09-28 RX ORDER — DEXAMETHASONE SODIUM PHOSPHATE 4 MG/ML
INJECTION, SOLUTION INTRA-ARTICULAR; INTRALESIONAL; INTRAMUSCULAR; INTRAVENOUS; SOFT TISSUE PRN
Status: DISCONTINUED | OUTPATIENT
Start: 2018-09-28 | End: 2018-09-28

## 2018-09-28 RX ORDER — LIDOCAINE 40 MG/G
CREAM TOPICAL
Status: DISCONTINUED | OUTPATIENT
Start: 2018-09-28 | End: 2018-09-28 | Stop reason: HOSPADM

## 2018-09-28 RX ORDER — ONDANSETRON 2 MG/ML
4 INJECTION INTRAMUSCULAR; INTRAVENOUS EVERY 30 MIN PRN
Status: DISCONTINUED | OUTPATIENT
Start: 2018-09-28 | End: 2018-09-28 | Stop reason: HOSPADM

## 2018-09-28 RX ORDER — ACETAMINOPHEN 325 MG/1
975 TABLET ORAL ONCE
Status: COMPLETED | OUTPATIENT
Start: 2018-09-28 | End: 2018-09-28

## 2018-09-28 RX ORDER — ONDANSETRON 4 MG/1
4 TABLET, ORALLY DISINTEGRATING ORAL
Status: DISCONTINUED | OUTPATIENT
Start: 2018-09-28 | End: 2018-09-28 | Stop reason: HOSPADM

## 2018-09-28 RX ORDER — KETOROLAC TROMETHAMINE 30 MG/ML
30 INJECTION, SOLUTION INTRAMUSCULAR; INTRAVENOUS ONCE
Status: COMPLETED | OUTPATIENT
Start: 2018-09-28 | End: 2018-09-28

## 2018-09-28 RX ORDER — SODIUM CHLORIDE, SODIUM LACTATE, POTASSIUM CHLORIDE, CALCIUM CHLORIDE 600; 310; 30; 20 MG/100ML; MG/100ML; MG/100ML; MG/100ML
INJECTION, SOLUTION INTRAVENOUS CONTINUOUS
Status: DISCONTINUED | OUTPATIENT
Start: 2018-09-28 | End: 2018-09-28 | Stop reason: HOSPADM

## 2018-09-28 RX ORDER — PROPOFOL 10 MG/ML
INJECTION, EMULSION INTRAVENOUS PRN
Status: DISCONTINUED | OUTPATIENT
Start: 2018-09-28 | End: 2018-09-28

## 2018-09-28 RX ORDER — METRONIDAZOLE 500 MG/1
500 TABLET ORAL 2 TIMES DAILY
Qty: 20 TABLET | Refills: 0 | Status: SHIPPED | OUTPATIENT
Start: 2018-09-28 | End: 2018-10-16

## 2018-09-28 RX ORDER — FENTANYL CITRATE 50 UG/ML
INJECTION, SOLUTION INTRAMUSCULAR; INTRAVENOUS PRN
Status: DISCONTINUED | OUTPATIENT
Start: 2018-09-28 | End: 2018-09-28

## 2018-09-28 RX ORDER — MEPERIDINE HYDROCHLORIDE 50 MG/ML
12.5 INJECTION INTRAMUSCULAR; INTRAVENOUS; SUBCUTANEOUS
Status: DISCONTINUED | OUTPATIENT
Start: 2018-09-28 | End: 2018-09-28 | Stop reason: HOSPADM

## 2018-09-28 RX ORDER — PHENAZOPYRIDINE HYDROCHLORIDE 100 MG/1
200 TABLET, FILM COATED ORAL ONCE
Status: COMPLETED | OUTPATIENT
Start: 2018-09-28 | End: 2018-09-28

## 2018-09-28 RX ORDER — CEFAZOLIN SODIUM 1 G/3ML
1 INJECTION, POWDER, FOR SOLUTION INTRAMUSCULAR; INTRAVENOUS SEE ADMIN INSTRUCTIONS
Status: CANCELLED | OUTPATIENT
Start: 2018-09-28

## 2018-09-28 RX ADMIN — KETOROLAC TROMETHAMINE 30 MG: 30 INJECTION, SOLUTION INTRAMUSCULAR at 13:33

## 2018-09-28 RX ADMIN — PROPOFOL 30 MG: 10 INJECTION, EMULSION INTRAVENOUS at 14:28

## 2018-09-28 RX ADMIN — SODIUM CHLORIDE, SODIUM LACTATE, POTASSIUM CHLORIDE, AND CALCIUM CHLORIDE: 600; 310; 30; 20 INJECTION, SOLUTION INTRAVENOUS at 14:02

## 2018-09-28 RX ADMIN — Medication 200 MCG: at 14:31

## 2018-09-28 RX ADMIN — DEXAMETHASONE SODIUM PHOSPHATE 4 MG: 4 INJECTION, SOLUTION INTRA-ARTICULAR; INTRALESIONAL; INTRAMUSCULAR; INTRAVENOUS; SOFT TISSUE at 14:13

## 2018-09-28 RX ADMIN — Medication 100 MCG: at 14:26

## 2018-09-28 RX ADMIN — PROPOFOL 170 MG: 10 INJECTION, EMULSION INTRAVENOUS at 14:07

## 2018-09-28 RX ADMIN — FENTANYL CITRATE 50 MCG: 50 INJECTION, SOLUTION INTRAMUSCULAR; INTRAVENOUS at 14:05

## 2018-09-28 RX ADMIN — TAZOBACTAM SODIUM AND PIPERACILLIN SODIUM 3.38 G: 375; 3 INJECTION, SOLUTION INTRAVENOUS at 13:35

## 2018-09-28 RX ADMIN — SODIUM CHLORIDE, SODIUM LACTATE, POTASSIUM CHLORIDE, AND CALCIUM CHLORIDE: 600; 310; 30; 20 INJECTION, SOLUTION INTRAVENOUS at 13:39

## 2018-09-28 RX ADMIN — PHENAZOPYRIDINE HYDROCHLORIDE 200 MG: 100 TABLET ORAL at 13:26

## 2018-09-28 RX ADMIN — METRONIDAZOLE 500 MG: 500 INJECTION, SOLUTION INTRAVENOUS at 14:15

## 2018-09-28 RX ADMIN — ACETAMINOPHEN 975 MG: 325 TABLET, FILM COATED ORAL at 13:25

## 2018-09-28 RX ADMIN — FENTANYL CITRATE 25 MCG: 50 INJECTION, SOLUTION INTRAMUSCULAR; INTRAVENOUS at 14:16

## 2018-09-28 RX ADMIN — ONDANSETRON 4 MG: 2 INJECTION INTRAMUSCULAR; INTRAVENOUS at 14:04

## 2018-09-28 RX ADMIN — SODIUM CHLORIDE, SODIUM LACTATE, POTASSIUM CHLORIDE, AND CALCIUM CHLORIDE: 600; 310; 30; 20 INJECTION, SOLUTION INTRAVENOUS at 16:12

## 2018-09-28 RX ADMIN — FENTANYL CITRATE 25 MCG: 50 INJECTION, SOLUTION INTRAMUSCULAR; INTRAVENOUS at 14:28

## 2018-09-28 RX ADMIN — ONDANSETRON 4 MG: 2 INJECTION INTRAMUSCULAR; INTRAVENOUS at 15:19

## 2018-09-28 ASSESSMENT — PAIN SCALES - GENERAL: PAINLEVEL: MILD PAIN (3)

## 2018-09-28 NOTE — OP NOTE
Preoperative Diagnosis: incomplete miscarriage, low grade fever  Postoperative Diagnosis: same  Procedure: suction D&C  Surgeon: Shady Wells MD  Assistant Surgeon: none    Clinical History: Amanda is a 33-year-old para 3 who 3+ weeks ago was diagnosed with an missed AB.  Desired expectant management.  Recently however was set up for a suction D&C in 3 days.  This morning called into the clinic stating she had a low-grade fever and began bleeding and passing some clots.  She was evaluated in the clinic and had a temp of 37.6 and a slightly tender uterus.  With a concern about possible developing endometritis versus other source for her low-grade fever we elected to proceed with a suction D&C.  She was prophylaxed with both IV Zosyn and Flagyl.    Findings: Uterus was 8-10 weeks size horizontal to slightly retroverted.  Cervix was dilated to about 10mm.  Curettage produced a large amount of products of conception.  Of note there was no odor or foul discharge.    Operative Report: Patient was taken to the OR ministered general anesthetic was prepped and draped in the dorsolithotomy position in usual fashion.  Timeout performed.  Weighted speculum placed.  Tenaculum placed anteriorly on the cervix.  Cervix was already dilated beyond 10 mm and was confirmed with dilator.  Using a curved #8 suction curette at routine settings the uterine cavity was systematically and gently curetted of a large amount of products of conception.  Curettage was continued and until it was felt that all products of conception had been removed.  An aerobic and anaerobic culture were obtained from the uterine cavity.  A small amount of bleeding was present on the cervix from the tenaculum site and stitch of 3-0 chromic was placed over this.  The uterus involuted nicely and bleeding was scant.    Patient tolerated the procedure well and was taken to recovery in stable condition:    EBL: 100 cc    Complications: none apparent

## 2018-09-28 NOTE — PROGRESS NOTES
Amanda comes in as an add-on.  She is scheduled for suction D&C on Monday for a missed AB.  Began bleeding last night passed a couple of clots.  Also feels that she has a little low-grade fever.  She does not have any obvious source but has been exposed a lot at school.    Interval history is otherwise unchanged.  See notes from 9/7/2018 and 9/242018.    On physical exam patient is alert orientated x3  Blood pressure 118/70 weight 142 pulse 104 temp 100.3  Chest clear to auscultation  Cardiac regular rate and rhythm  Abdomen soft nontender  Pelvic:  Speculum exam small amount of dark blood cervix very anterior.  Cervix dilated maybe 1 cm  Bimanual shows slightly tender retroverted 8+ weeks size uterus    Assessment:  1.  Missed AB, scheduled in 3 days for suction D&C.  Beginning to miscarry.  With low-grade temp and slightly tender uterus concern arises a potential uterine infection galloping.  2. Otherwise health new    Plan:  Discussed the above with the patient.  We will proceed this afternoon with a suction D&C.  She has had nothing the eat since yesterday at supper.  Will give Ancef during the surgery and probably send home on a course of oral antibiotics in case this is a developing septic AB.  All questions answered

## 2018-09-28 NOTE — PROGRESS NOTES
Pt arrived to [facility] at 1645 with PACU nurse and pt's . Pt reports no pain, scant bleeding on chon pad, and vitally stable, afebrile at 97.4 tympanic. Will continue to monitor and provide interventions as needed.

## 2018-09-28 NOTE — ANESTHESIA POSTPROCEDURE EVALUATION
Patient: Amanda Lockett    Procedure(s):  Suction Dilation and Curettage.    . - Wound Class: II-Clean Contaminated    Diagnosis:missed   Diagnosis Additional Information: No value filed.    Anesthesia Type:  General, LMA    Note:  Anesthesia Post Evaluation    Patient location during evaluation: Phase 2  Patient participation: Able to fully participate in evaluation  Level of consciousness: awake and alert  Pain management: adequate  Airway patency: patent  Cardiovascular status: blood pressure returned to baseline, acceptable and hemodynamically stable  Respiratory status: acceptable  Hydration status: acceptable  PONV: none     Anesthetic complications: None          Last vitals:  Vitals:    18 1500 18 1503 18 1505   BP: 110/69  113/71   Resp: 14  16   Temp:   98  F (36.7  C)   SpO2: 100% 100% 98%         Electronically Signed By: NICOL Rausch CRNA  2018  3:10 PM

## 2018-09-28 NOTE — OR NURSING
Patient report given to Carlee Arguello on [facility]. Patient IV fluids switched over to pump tubing. Patient denies pain or nausea at this time. Glasses given to , clothing transported with patient.

## 2018-09-28 NOTE — IP AVS SNAPSHOT
MRN:[id number]                      After Visit Summary   9/28/2018    Amanda Lockett    MRN: [id number]           Thank you!     Thank you for choosing [facility] for your care. Our goal is always to provide you with excellent care. Hearing back from our patients is one way we can continue to improve our services. Please take a few minutes to complete the written survey that you may receive in the mail after you visit with us. Thank you!        Patient Information     Date Of Birth          1985        About your hospital stay     You were admitted on:  September 28, 2018 You last received care in the:  [facility]    You were discharged on:  September 28, 2018       Who to Call     For medical emergencies, please call 911.  For non-urgent questions about your medical care, please call your primary care provider or clinic, [phone]  For questions related to your surgery, please call your surgery clinic        Attending Provider     Provider Specialty    Shady Wells MD OB/Gyn       Primary Care Provider Office Phone # Fax #    Eugenia Annie Araujo [phone]      After Care Instructions     Discharge Instructions       Resume pre procedure diet            Discharge Instructions       Pelvic Rest. No tampons, douching or intercourse for  2  weeks.            Discharge Instructions       Patient may return to work POD  3            Discharge Instructions       Patient may drive beginning POD  1            Discharge Instructions       Patient to arrange follow up appointment in 1  weeks            No alcohol       NO ALCOHOL for 24 hours post procedure            No driving or operating machinery       No driving or operating machinery until day after procedure            Shower        Shower on Post-op day  0.   DO NOT take a bath                  Your next 10 appointments already scheduled     Oct 16, 2018  2:45 PM CDT   [facility] with Shady THOMPSON  [facility]    [address]  [place] [address] [phone]              Further instructions from your care team       There are No Restrictions on what you eat  Please refrain from intercourse or tampons use for 6 weeks  You should avoid long trips or get out of car to exercise at least every hour.  Avoid driving if your abdomen is too tender since this may prevent you from reacting appropriately in an emergency.  NEVER Drive a car while under the influence of pain medication prescribed to you.  You may use a pad for any vaginal discharge. You may have some bloody vaginal spotting, and this is normal.      If you develop any severe abdominal or pelvic pain, excessive vaginal bright red bleeding, or temperature greater than 101, you should call your Dr or visit the ER.    Pending Results     Date and Time Order Name Status Description    9/28/2018 1450 Anaerobic bacterial culture In process     9/28/2018 1449 Tissue Culture Aerobic Bacterial In process     9/28/2018 1425 Surgical pathology exam In process             Admission Information     Date & Time Provider Department Dept. Phone    9/28/2018 Shady Wells MD [facility] [phone]      Your Vitals Were     Blood Pressure Temperature Respirations Last Period Pulse Oximetry       109/69 (Cuff Size: Adult Regular) 97.4  F (36.3  C) (Tympanic) 16 09/28/2018 (Exact Date) 100%       MyChart Information     [employer] gives you secure access to your electronic health record. If you see a primary care provider, you can also send messages to your care team and make appointments. If you have questions, please call your primary care clinic.  If you do not have a primary care provider, please call [phone] and they will assist you.        Care EveryWhere ID     This is your Care EveryWhere ID. This could be used by other organizations to access your  [facility] medical records  [id number]        Equal Access to Services     FIDELINA TWIN : Hadii aad ku hadrainsue Meaghanali, wayelenada luqadaha, qaybta kalocobora shirleyduartebora, janeth kimjose robertodominique gray. So [facility] [phone].    ATENCIÓN: Si habla español, tiene a de jesus disposición servicios gratuitos de asistencia lingüística. Llame al [phone].    We comply with applicable federal civil rights laws and [place] laws. We do not discriminate on the basis of race, color, national origin, age, disability, sex, sexual orientation, or gender identity.               Review of your medicines      START taking        Dose / Directions    amoxicillin-clavulanate 500-125 MG per tablet   Commonly known as:  AUGMENTIN        Dose:  1 tablet   Take 1 tablet by mouth 2 times daily   Quantity:  20 tablet   Refills:  0       metroNIDAZOLE 500 MG tablet   Commonly known as:  FLAGYL        Dose:  500 mg   Take 1 tablet (500 mg) by mouth 2 times daily   Quantity:  20 tablet   Refills:  0            Where to get your medicines      These medications were sent to [facility] - [place], [place] - [address]  [address] [facility] [address]     Phone:  [phone]     amoxicillin-clavulanate 500-125 MG per tablet    metroNIDAZOLE 500 MG tablet                Protect others around you: Learn how to safely use, store and throw away your medicines at www.disposemymeds.org.        ANTIBIOTIC INSTRUCTION     You've Been Prescribed an Antibiotic - Now What?  Your healthcare team thinks that you or your loved one might have an infection. Some infections can be treated with antibiotics, which are powerful, life-saving drugs. Like all medications, antibiotics have side effects and should only be used when necessary. There are some important things you should know about your antibiotic treatment.      Your healthcare team may run tests before you start taking an antibiotic.    Your team may take samples  (e.g., from your blood, urine or other areas) to run tests to look for bacteria. These test can be important to determine if you need an antibiotic at all and, if you do, which antibiotic will work best.      Within a few days, your healthcare team might change or even stop your antibiotic.    Your team may start you on an antibiotic while they are working to find out what is making you sick.    Your team might change your antibiotic because test results show that a different antibiotic would be better to treat your infection.    In some cases, once your team has more information, they learn that you do not need an antibiotic at all. They may find out that you don't have an infection, or that the antibiotic you're taking won't work against your infection. For example, an infection caused by a virus can't be treated with antibiotics. Staying on an antibiotic when you don't need it is more likely to be harmful than helpful.      You may experience side effects from your antibiotic.    Like all medications, antibiotics have side effects. Some of these can be serious.    Let you healthcare team know if you have any known allergies when you are admitted to the hospital.    One significant side effect of nearly all antibiotics is the risk of severe and sometimes deadly diarrhea caused by Clostridium difficile (C. Difficile). This occurs when a person takes antibiotics because some good germs are destroyed. Antibiotic use allows C. diificile to take over, putting patients at high risk for this serious infection.    As a patient or caregiver, it is important to understand your or your loved one's antibiotic treatment. It is especially important for caregivers to speak up when patients can't speak for themselves. Here are some important questions to ask your healthcare team.    What infection is this antibiotic treating and how do you know I have that infection?    What side effects might occur from this antibiotic?    How  long will I need to take this antibiotic?    Is it safe to take this antibiotic with other medications or supplements (e.g., vitamins) that I am taking?     Are there any special directions I need to know about taking this antibiotic? For example, should I take it with food?    How will I be monitored to know whether my infection is responding to the antibiotic?    What tests may help to make sure the right antibiotic is prescribed for me?      Information provided by:  www.cdc.gov/getsmart  U.S. Department of Health and Human Services  Centers for disease Control and Prevention  National Center for Emerging and Zoonotic Infectious Diseases  Division of Healthcare Quality Promotion             Medication List: This is a list of all your medications and when to take them. Check marks below indicate your daily home schedule. Keep this list as a reference.      Medications           Morning Afternoon Evening Bedtime As Needed    amoxicillin-clavulanate 500-125 MG per tablet   Commonly known as:  AUGMENTIN   Take 1 tablet by mouth 2 times daily                                metroNIDAZOLE 500 MG tablet   Commonly known as:  FLAGYL   Take 1 tablet (500 mg) by mouth 2 times daily

## 2018-09-28 NOTE — ANESTHESIA PREPROCEDURE EVALUATION
Anesthesia Evaluation     . Pt has had prior anesthetic.            ROS/MED HX    ENT/Pulmonary:  - neg pulmonary ROS     Neurologic:  - neg neurologic ROS     Cardiovascular:  - neg cardiovascular ROS       METS/Exercise Tolerance:  >4 METS   Hematologic:  - neg hematologic  ROS       Musculoskeletal:  - neg musculoskeletal ROS       GI/Hepatic:  - neg GI/hepatic ROS       Renal/Genitourinary:  - ROS Renal section negative       Endo:  - neg endo ROS       Psychiatric:  - neg psychiatric ROS       Infectious Disease:  - neg infectious disease ROS       Malignancy:      - no malignancy   Other:    (+) No chance of pregnancy   - neg other ROS                 Physical Exam  Normal systems: cardiovascular, pulmonary and dental    Airway   Mallampati: II  TM distance: >3 FB  Neck ROM: full    Dental     Cardiovascular   Rhythm and rate: regular and normal      Pulmonary    breath sounds clear to auscultation                    Anesthesia Plan      History & Physical Review      ASA Status:  1 .        Plan for General and LMA          Postoperative Care      Consents                          .

## 2018-09-28 NOTE — BRIEF OP NOTE
[facility] Brief Operative Note    Pre-operative diagnosis: missed    Post-operative diagnosis incomplete miscarriage, low-grade fever     Procedure: Procedure(s):  Suction Dilation and Curettage.    . - Wound Class: II-Clean Contaminated   Surgeon(s): Surgeon(s) and Role:     * Shady Wells MD - Primary   Estimated blood loss: * No values recorded between 2018  2:21 PM and 2018  2:35 PM * 100 ml   Specimens:   ID Type Source Tests Collected by Time Destination   A : products of conception Products of Conception Placenta/ Fragments/ Fetus from Miscarriage or Termination SURGICAL PATHOLOGY EXAM Shady Wells MD 2018  2:23 PM       Findings: Cervix dilated to 10 mm.  Large amount of POC removed.  Uterus involuted well post procedure.  No odor noted.

## 2018-09-28 NOTE — OR NURSING
PACU Respiratory Event Documentation     1) Episodes of Apnea greater than or equal to 10 seconds: no    2) Bradypnea - less than 8 breaths per minute: no    3) Pain score on 0 to 10 scale: denies    4) Pain-sedation mismatch (yes or no): no    5) Repeated 02 desaturation less than 90% (yes or no): no    Anesthesia notified? (yes or no): no    Any of the above events occuring repeatedly in separate 30 minute intervals may be considered recurrent PACU respiratory events.    Augustina Smith RN

## 2018-09-28 NOTE — ANESTHESIA CARE TRANSFER NOTE
Patient: Amanda Lockett    Procedure(s):  Suction Dilation and Curettage.    . - Wound Class: II-Clean Contaminated    Diagnosis: missed   Diagnosis Additional Information: No value filed.    Anesthesia Type:   General, LMA     Note:  Airway :Face Mask  Patient transferred to:PACU  Handoff Report: Identifed the Patient, Identified the Reponsible Provider, Reviewed the pertinent medical history, Discussed the surgical course, Reviewed Intra-OP anesthesia mangement and issues during anesthesia, Set expectations for post-procedure period and Allowed opportunity for questions and acknowledgement of understanding      Vitals: (Last set prior to Anesthesia Care Transfer)    CRNA VITALS  2018 1407 - 2018 1443      2018             Pulse: 67    Ht Rate: 67    SpO2: 100 %    Resp Rate (observed): 12    Resp Rate (set): 10                Electronically Signed By: NICOL Rausch CRNA  2018  2:43 PM

## 2018-09-28 NOTE — MR AVS SNAPSHOT
After Visit Summary   9/28/2018    Amanda Lockett    MRN: [id number]           Patient Information     Date Of Birth          1985        Visit Information        Provider Department      9/28/2018 11:30 AM Shady Wells MD [facility]        Today's Diagnoses     Incomplete miscarriage    -  1    Fever, unspecified fever cause           Follow-ups after your visit        Your next 10 appointments already scheduled     Sep 28, 2018   Procedure with Shady Wells MD   [facility]    [address]  [place] [address] [phone]            Oct 16, 2018  2:45 PM CDT   SHORT with Shady Wells MD   [facility]    [address]  [place] [address] [phone]              Who to contact     If you have questions or need follow up information about today's clinic visit or your schedule please contact [facility] directly at [phone].  Normal or non-critical lab and imaging results will be communicated to you by "[employer]hart, letter or phone within 4 business days after the clinic has received the results. If you do not hear from us within 7 days, please contact the clinic through [employer] or phone. If you have a critical or abnormal lab result, we will notify you by phone as soon as possible.  Submit refill requests through [employer] or call your pharmacy and they will forward the refill request to us. Please allow 3 business days for your refill to be completed.          Additional Information About Your Visit        "[employer]hart Information     [employer] gives you secure access to your electronic health record. If you see a primary care provider, you can also send messages to your care team and make appointments. If you have questions, please call your primary care clinic.  If you do not have a primary care  provider, please call [phone] and they will assist you.        Care EveryWhere ID     This is your Care EveryWhere ID. This could be used by other organizations to access your [facility] medical records  [id number]        Your Vitals Were     Pulse Temperature Last Period Breastfeeding? BMI (Body Mass Index)       104 100.3  F (37.9  C) (Tympanic) 09/28/2018 (Exact Date) No 23.63 kg/m2        Blood Pressure from Last 3 Encounters:   09/28/18 118/70   09/07/18 122/76   08/20/18 128/70    Weight from Last 3 Encounters:   09/28/18 64.4 kg (142 lb)   09/07/18 64.4 kg (142 lb)   08/20/18 64 kg (141 lb)              Today, you had the following     No orders found for display       Primary Care Provider Office Phone # Fax #    Eugenia Annie Araujo -[phone]       [address] [facility] [address]        Equal Access to Services     [facility]: Hadii aad ku hadasho Soomaali, waaxda luqadaha, qaybta kaalmada adeegyada, waxay aidan haymarzena garcia . So [facility] [phone].    ATENCIÓN: Si habla español, tiene a de jesus disposición servicios gratuitos de asistencia lingüística. Llame al [phone].    We comply with applicable federal civil rights laws and [place] laws. We do not discriminate on the basis of race, color, national origin, age, disability, sex, sexual orientation, or gender identity.            Thank you!     Thank you for choosing [facility] AND \[facility] [place] [facility]\""  for your care. Our goal is always to provide you with excellent care. Hearing back from our patients is one way we can continue to improve our services. Please take a few minutes to complete the written survey that you may receive in the mail after your visit with us. Thank you!             Your Updated Medication List - Protect others around you: Learn how to safely use, store and throw away your medicines at www.disposemymeds.org.      Notice  As of 9/28/2018 12:59 PM    You have not been  prescribed any medications.

## 2018-09-28 NOTE — OR NURSING
Dr. Johnson called to update on patient's status- nausea, diaphoresis. Updated on vitals, denies pain.   Doctor would like to observe patient longer on the medical floor.   Supervisor called.

## 2018-09-28 NOTE — NURSING NOTE
"Chief Complaint   Patient presents with     RECHECK     Fever D+C Scheduled 10/1    Has been having fever X few days along with body aches.  Miscarried beginning of the month.  Scheduled for D&C 10/1.  Today she has a fever.  She also finally started bleeding and passed a large clot.    Carly Ariza LPN........................9/28/2018  11:42 AM       Initial There were no vitals taken for this visit. Estimated body mass index is 23.63 kg/(m^2) as calculated from the following:    Height as of 8/20/18: 1.651 m (5' 5\").    Weight as of 9/7/18: 64.4 kg (142 lb).  Medication Reconciliation: complete    Carly Ariza LPN  "

## 2018-09-28 NOTE — IP AVS SNAPSHOT
[facility]    [address]    [place] [address]    Phone:  [phone]    Fax:  [phone]                                       After Visit Summary   9/28/2018    Amanda Lockett    MRN: [id number]           After Visit Summary Signature Page     I have received my discharge instructions, and my questions have been answered. I have discussed any challenges I see with this plan with the nurse or doctor.    ..........................................................................................................................................  Patient/Patient Representative Signature      ..........................................................................................................................................  Patient Representative Print Name and Relationship to Patient    ..................................................               ................................................  Date                                   Time    ..........................................................................................................................................  Reviewed by Signature/Title    ...................................................              ..............................................  Date                                               Time          22EPIC Rev 08/18

## 2018-09-29 NOTE — PROGRESS NOTES
Feeling much better.  All vitals stable  Has remained afebrile  Scant bleeding/cramping  Okay for discharge  To complete po antibiotics course at home  Instructions given  Warning signs reviewed  F/u one week

## 2018-09-29 NOTE — PROGRESS NOTES
Ok to discharge home per Dr. Shady Wells. Patient discharged to home at 7:41 PM via ambulation. Accompanied by spouse. Discharge instructions reviewed with patient and spouse, opportunity offered to ask questions. Prescriptions sent to patients preferred pharmacy. All belongings sent with patient.    Carlee Peters RN

## 2018-09-29 NOTE — DISCHARGE INSTRUCTIONS
There are No Restrictions on what you eat  Please refrain from intercourse or tampons use for 6 weeks  You should avoid long trips or get out of car to exercise at least every hour.  Avoid driving if your abdomen is too tender since this may prevent you from reacting appropriately in an emergency.  NEVER Drive a car while under the influence of pain medication prescribed to you.  You may use a pad for any vaginal discharge. You may have some bloody vaginal spotting, and this is normal.      If you develop any severe abdominal or pelvic pain, excessive vaginal bright red bleeding, or temperature greater than 101, you should call your Dr or visit the ER.

## 2018-10-05 LAB
BACTERIA SPEC CULT: NO GROWTH
SPECIMEN SOURCE: NORMAL

## 2018-10-06 LAB
BACTERIA SPEC CULT: NORMAL
SPECIMEN SOURCE: NORMAL

## 2018-10-16 ENCOUNTER — OFFICE VISIT (OUTPATIENT)
Dept: OBGYN | Facility: OTHER | Age: 33
End: 2018-10-16
Attending: OBSTETRICS & GYNECOLOGY
Payer: COMMERCIAL

## 2018-10-16 VITALS
WEIGHT: 142 LBS | SYSTOLIC BLOOD PRESSURE: 126 MMHG | HEART RATE: 72 BPM | DIASTOLIC BLOOD PRESSURE: 72 MMHG | BODY MASS INDEX: 23.63 KG/M2

## 2018-10-16 DIAGNOSIS — O03.4 INCOMPLETE MISCARRIAGE: Primary | ICD-10-CM

## 2018-10-16 PROCEDURE — 99024 POSTOP FOLLOW-UP VISIT: CPT | Performed by: OBSTETRICS & GYNECOLOGY

## 2018-10-16 ASSESSMENT — PAIN SCALES - GENERAL: PAINLEVEL: NO PAIN (0)

## 2018-10-16 NOTE — NURSING NOTE
Chief Complaint   Patient presents with     Surgical Followup     D&C       Medication Reconciliation: completed   Carly Ariza LPN  10/16/2018 2:57 PM

## 2018-10-16 NOTE — PROGRESS NOTES
Amanda is here for postop visit.  Had a suction D&C for missed AB/incomplete AB.  Really there was a concern that she might be getting infected.  Cultures ended up being negative and perioperative antibiotics were discontinued.  She has done exceptionally well since her surgery.  Is contemplating a pregnancy in the next couple of months    Exam was not needed today    Assessment satisfactory recovery from suction D&C    Plan: Released from restrictions.  Advised one normal cycle before reattempting pregnancy.

## 2018-10-16 NOTE — MR AVS SNAPSHOT
After Visit Summary   10/16/2018    Amanda Lockett    MRN: [id number]           Patient Information     Date Of Birth          1985        Visit Information        Provider Department      10/16/2018 2:45 PM Shady Wells MD [facility]        Today's Diagnoses     Incomplete miscarriage    -  1       Follow-ups after your visit        Who to contact     If you have questions or need follow up information about today's clinic visit or your schedule please contact [facility] directly at [phone].  Normal or non-critical lab and imaging results will be communicated to you by Excordahart, letter or phone within 4 business days after the clinic has received the results. If you do not hear from us within 7 days, please contact the clinic through GoYoDeot or phone. If you have a critical or abnormal lab result, we will notify you by phone as soon as possible.  Submit refill requests through Metatomix or call your pharmacy and they will forward the refill request to us. Please allow 3 business days for your refill to be completed.          Additional Information About Your Visit        MyChart Information     Metatomix gives you secure access to your electronic health record. If you see a primary care provider, you can also send messages to your care team and make appointments. If you have questions, please call your primary care clinic.  If you do not have a primary care provider, please call [phone] and they will assist you.        Care EveryWhere ID     This is your Care EveryWhere ID. This could be used by other organizations to access your [place] medical records  [id number]        Your Vitals Were     Pulse Last Period Breastfeeding? BMI (Body Mass Index)          72 09/28/2018 (Exact Date) No 23.63 kg/m2         Blood Pressure from Last 3 Encounters:   10/16/18 126/72   09/28/18 109/69   09/28/18 118/70    Weight from Last 3 Encounters:    10/16/18 64.4 kg (142 lb)   09/28/18 64.4 kg (142 lb)   09/07/18 64.4 kg (142 lb)              Today, you had the following     No orders found for display       Primary Care Provider Office Phone # Fax #    Eugenia Annie Araujo [phone]       [address] [facility] [address]        Equal Access to Services     [facility] : Hadii aad ku hadasho Soomaali, waaxda luqadaha, qaybta kaalmada adeegyada, waxay pazin hayaan adesebastián garcia . So [facility] [phone].    ATENCIÓN: Si habla español, tiene a de jesus disposición servicios gratuitos de asistencia lingüística. Llame al [phone].    We comply with applicable federal civil rights laws and [place] laws. We do not discriminate on the basis of race, color, national origin, age, disability, sex, sexual orientation, or gender identity.            Thank you!     Thank you for choosing [facility] AND [facility]  for your care. Our goal is always to provide you with excellent care. Hearing back from our patients is one way we can continue to improve our services. Please take a few minutes to complete the written survey that you may receive in the mail after your visit with us. Thank you!             Your Updated Medication List - Protect others around you: Learn how to safely use, store and throw away your medicines at www.disposemymeds.org.      Notice  As of 10/16/2018  3:23 PM    You have not been prescribed any medications.

## 2018-11-18 ENCOUNTER — MYC MEDICAL ADVICE (OUTPATIENT)
Dept: FAMILY MEDICINE | Facility: OTHER | Age: 33
End: 2018-11-18

## 2018-11-19 ENCOUNTER — OFFICE VISIT (OUTPATIENT)
Dept: FAMILY MEDICINE | Facility: OTHER | Age: 33
End: 2018-11-19
Attending: FAMILY MEDICINE
Payer: COMMERCIAL

## 2018-11-19 VITALS
HEART RATE: 88 BPM | TEMPERATURE: 97.1 F | SYSTOLIC BLOOD PRESSURE: 122 MMHG | WEIGHT: 144 LBS | DIASTOLIC BLOOD PRESSURE: 76 MMHG | BODY MASS INDEX: 23.96 KG/M2

## 2018-11-19 DIAGNOSIS — H65.92 OME (OTITIS MEDIA WITH EFFUSION), LEFT: Primary | ICD-10-CM

## 2018-11-19 PROCEDURE — 99213 OFFICE O/P EST LOW 20 MIN: CPT | Performed by: FAMILY MEDICINE

## 2018-11-19 RX ORDER — AMOXICILLIN 875 MG
875 TABLET ORAL 2 TIMES DAILY
Qty: 20 TABLET | Refills: 0 | Status: SHIPPED | OUTPATIENT
Start: 2018-11-19 | End: 2020-08-27

## 2018-11-19 ASSESSMENT — ENCOUNTER SYMPTOMS
SINUS PRESSURE: 0
COUGH: 1
SINUS PAIN: 0
RHINORRHEA: 1
CHILLS: 0
SORE THROAT: 0
FEVER: 0

## 2018-11-19 ASSESSMENT — PAIN SCALES - GENERAL: PAINLEVEL: NO PAIN (0)

## 2018-11-19 NOTE — PROGRESS NOTES
"  SUBJECTIVE:   Nursing Notes:   Vidhya Rivers LPN  2018  9:49 AM  Unsigned  Chief Complaint   Patient presents with     Ear Problem     Pt present to clinic today for ear pain that started on Saturday night.  Initial /76 (BP Location: Right arm, Patient Position: Sitting, Cuff Size: Adult Regular)  Pulse 88  Temp 97.1  F (36.2  C) (Tympanic)  Wt 144 lb (65.3 kg)  Breastfeeding? No  BMI 23.96 kg/m2 Estimated body mass index is 23.96 kg/(m^2) as calculated from the following:    Height as of 18: 5' 5\" (1.651 m).    Weight as of this encounter: 144 lb (65.3 kg).  Medication Reconciliation: complete    Vidhya Rivers LPN    Amanda Lockett is a 33 year old female who presents to clinic today for a complaint of a left ear problem.  Started hurting 2 days ago.  In the middle of the night, had a lot of pressure and it ruptured.  No fever.  Had cold symptoms for a few weeks.    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       Encounter for counseling regarding contraception 2011     Priority: Medium     Contact dermatitis 2011     Priority: Medium     Overview:   Secondary to chloro-prep       Past Medical History:   Diagnosis Date     Benign neoplasm of right breast     2016,Proven by core needle biopsy 2016.     Personal history of other diseases of the female genital tract     G3, , s/p VAVD (baby weighed 8#15 oz);  for transverse presentation and fetal macrosomia (weight 9 lbs. 2 oz.), elective repeat       Past Surgical History:   Procedure Laterality Date      SECTION      09/15/2011, for breech presentation      SECTION      2015,Elective " repeat      DILATION AND CURETTAGE SUCTION N/A 2018    Procedure: DILATION AND CURETTAGE SUCTION;  Suction Dilation and Curettage.    .;  Surgeon: Shady Wells MD;  Location: [facility] OR     MYRINGOTOMY, INSERT TUBE, COMBINED      ,PE tubes x 3, last placement when she was in 3rd grade     OTHER SURGICAL HISTORY      ,15716,ORAL SURGERY     Family History   Problem Relation Age of Onset     Thyroid Disease Mother      Thyroid Disease,hypothyroid     Hypertension Mother      Hypertension     Breast Cancer Paternal Grandfather      Cancer-breast,breast cancer in 70s;  of different cancer     Cancer Maternal Grandfather      Cancer,prostate cancer     Other - See Comments Other      No family history of anesthesia intolerance or bleeding/clotting disorders     Breast Cancer Maternal Aunt      Cancer-breast     Breast Cancer Paternal Aunt      Cancer-breast     Colon Cancer No family hx of      Cancer-colon     Ovarian Cancer No family hx of      Cancer-ovarian     Pancreatic Cancer No family hx of      Cancer-pancreatic     Social History   Substance Use Topics     Smoking status: Never Smoker     Smokeless tobacco: Never Used     Alcohol use No     Social History     Social History Narrative    Graduated from [employer] in [place] in 2006 with degree in business.  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   to Howard Lockett; he works for [employer].        Chris Lockett - Son - 10/20/09;     Randy Lockett - son - 9/15/11;     Alphonse Lockett - son - 1/23/15.      Lydia Rodriguez - Mother.     Current Outpatient Prescriptions   Medication Sig Dispense Refill     amoxicillin (AMOXIL) 875 MG tablet Take 1 tablet (875 mg) by mouth 2 times daily 20 tablet 0     Allergies   Allergen Reactions     Chlorhexidine Rash     Chloraprep used prior to Day Surgery       Review of Systems   Constitutional: Negative for chills and fever.   HENT: Positive for congestion, ear  discharge (this has resolved), ear pain (prior to tympanic rupture), hearing loss and rhinorrhea. Negative for sinus pain, sinus pressure and sore throat.    Respiratory: Positive for cough.         OBJECTIVE:     /76 (BP Location: Right arm, Patient Position: Sitting, Cuff Size: Adult Regular)  Pulse 88  Temp 97.1  F (36.2  C) (Tympanic)  Wt 144 lb (65.3 kg)  Breastfeeding? No  BMI 23.96 kg/m2  Body mass index is 23.96 kg/(m^2).  Physical Exam   Constitutional: She appears well-developed.   HENT:   Head: Normocephalic.   Nose: Nose normal.   Mouth/Throat: Oropharynx is clear and moist.   Left TM is red and distorted.  No definite perforation noted.  No drainage in canal.    Right TM is very retracted and minimally injected.   Eyes: Pupils are equal, round, and reactive to light.   Neck: Normal range of motion. Neck supple. No thyromegaly present.   Cardiovascular: Normal rate, regular rhythm and normal heart sounds.    No murmur heard.  Pulmonary/Chest: Effort normal and breath sounds normal. No respiratory distress. She has no wheezes. She has no rales.   Lymphadenopathy:     She has no cervical adenopathy.   Psychiatric: She has a normal mood and affect.       PHQ-2 Score:     PHQ-2 ( 1999 Pfizer) 11/19/2018 10/16/2018   Q1: Little interest or pleasure in doing things 0 0   Q2: Feeling down, depressed or hopeless 0 0   PHQ-2 Score 0 0       I personally reviewed results withpatient as listed below:   Diagnostic Test Results:  none     ASSESSMENT/PLAN:       ICD-10-CM    1. OME (otitis media with effusion), left H65.92 amoxicillin (AMOXIL) 875 MG tablet       1.  Since there is not a visible perforation any longer and she still appears to have an active Otitis Media without any drainage noted any longer, will treat via oral route instead of by otic drops.  Prescription for amoxicillin given as above.  Discussed that if her hearing has not returned to normal over the next 4-8 weeks, she should contact us  for a referral to ENT.    Eugenia Araujo MD  [facility] AND [facility]

## 2018-11-19 NOTE — TELEPHONE ENCOUNTER
Called patient and she stated that she would come in this morning appointment set for patient to come in.  Elisabeth Crow LPN .......11/19/2018 9:26 AM

## 2018-11-19 NOTE — MR AVS SNAPSHOT
After Visit Summary   11/19/2018    Amanda Lockett    MRN: [id number]           Patient Information     Date Of Birth          1985        Visit Information        Provider Department      11/19/2018 9:45 AM Eugenia Araujo MD [facility]        Today's Diagnoses     OME (otitis media with effusion), left    -  1       Follow-ups after your visit        Who to contact     If you have questions or need follow up information about today's clinic visit or your schedule please contact [facility] directly at [phone].  Normal or non-critical lab and imaging results will be communicated to you by Simply Measuredhart, letter or phone within 4 business days after the clinic has received the results. If you do not hear from us within 7 days, please contact the clinic through Black Rhino Gamest or phone. If you have a critical or abnormal lab result, we will notify you by phone as soon as possible.  Submit refill requests through Fotolog or call your pharmacy and they will forward the refill request to us. Please allow 3 business days for your refill to be completed.          Additional Information About Your Visit        MyChart Information     Fotolog gives you secure access to your electronic health record. If you see a primary care provider, you can also send messages to your care team and make appointments. If you have questions, please call your primary care clinic.  If you do not have a primary care provider, please call [phone] and they will assist you.        Care EveryWhere ID     This is your Care EveryWhere ID. This could be used by other organizations to access your [place] medical records  [id number]        Your Vitals Were     Pulse Temperature Breastfeeding? BMI (Body Mass Index)          88 97.1  F (36.2  C) (Tympanic) No 23.96 kg/m2         Blood Pressure from Last 3 Encounters:   11/19/18 122/76   10/16/18 126/72   09/28/18 109/69    Weight  from Last 3 Encounters:   11/19/18 144 lb (65.3 kg)   10/16/18 142 lb (64.4 kg)   09/28/18 142 lb (64.4 kg)              Today, you had the following     No orders found for display         Today's Medication Changes          These changes are accurate as of 11/19/18 10:00 AM.  If you have any questions, ask your nurse or doctor.               Start taking these medicines.        Dose/Directions    amoxicillin 875 MG tablet   Commonly known as:  AMOXIL   Used for:  OME (otitis media with effusion), left   Started by:  Eugenia Araujo MD        Dose:  875 mg   Take 1 tablet (875 mg) by mouth 2 times daily   Quantity:  20 tablet   Refills:  0            Where to get your medicines      These medications were sent to [facility] [address] [facility] - [place], [place] - [address]  [address] [facility] [address]     Phone:  [phone]     amoxicillin 875 MG tablet                Primary Care Provider Office Phone # Fax #    Eugenia Araujo [phone]       [address] [facility] [address]        Equal Access to Services     [facility] Center[facility]AIS AH: Hadii aad ku hadasho Sosukhwinderali, waaxda luqadaha, qaybta kaalmada adeegyada, janeth gray. So [facility] [phone].    ATENCIÓN: Si habla español, tiene a de jesus disposición servicios gratuitos de asistencia lingüística. Llame al [phone].    We comply with applicable federal civil rights laws and [place] laws. We do not discriminate on the basis of race, color, national origin, age, disability, sex, sexual orientation, or gender identity.            Thank you!     Thank you for choosing [facility] AND \[facility]\""  for your care. Our goal is always to provide you with excellent care. Hearing back from our patients is one way we can continue to improve our services. Please take a few minutes to complete the written survey that you may receive in the mail after your visit with us. Thank you!              Your Updated Medication List - Protect others around you: Learn how to safely use, store and throw away your medicines at www.disposemymeds.org.          This list is accurate as of 11/19/18 10:00 AM.  Always use your most recent med list.                   Brand Name Dispense Instructions for use Diagnosis    amoxicillin 875 MG tablet    AMOXIL    20 tablet    Take 1 tablet (875 mg) by mouth 2 times daily    OME (otitis media with effusion), left

## 2018-11-19 NOTE — TELEPHONE ENCOUNTER
I could see her today (this morning) as a double book (she could come as soon as she is able) to take a look and see if she should have antibiotics as well.  Eugenia Araujo MD on 11/19/2018 at 9:19 AM

## 2018-11-19 NOTE — NURSING NOTE
"Chief Complaint   Patient presents with     Ear Problem     Pt present to clinic today for ear pain that started on Saturday night.  Initial /76 (BP Location: Right arm, Patient Position: Sitting, Cuff Size: Adult Regular)  Pulse 88  Temp 97.1  F (36.2  C) (Tympanic)  Wt 144 lb (65.3 kg)  Breastfeeding? No  BMI 23.96 kg/m2 Estimated body mass index is 23.96 kg/(m^2) as calculated from the following:    Height as of 8/20/18: 5' 5\" (1.651 m).    Weight as of this encounter: 144 lb (65.3 kg).  Medication Reconciliation: complete    Vidhya Rivers LPN  "

## 2020-03-01 ENCOUNTER — HEALTH MAINTENANCE LETTER (OUTPATIENT)
Age: 35
End: 2020-03-01

## 2020-08-18 ENCOUNTER — VIRTUAL VISIT (OUTPATIENT)
Dept: OBGYN | Facility: OTHER | Age: 35
End: 2020-08-18
Attending: FAMILY MEDICINE
Payer: COMMERCIAL

## 2020-08-18 VITALS — WEIGHT: 142 LBS | HEIGHT: 65 IN | BODY MASS INDEX: 23.66 KG/M2

## 2020-08-18 DIAGNOSIS — Z32.01 POSITIVE PREGNANCY TEST: Primary | ICD-10-CM

## 2020-08-18 PROCEDURE — 99211 OFF/OP EST MAY X REQ PHY/QHP: CPT | Mod: TEL

## 2020-08-18 ASSESSMENT — MIFFLIN-ST. JEOR: SCORE: 1339.99

## 2020-08-18 ASSESSMENT — PATIENT HEALTH QUESTIONNAIRE - PHQ9: SUM OF ALL RESPONSES TO PHQ QUESTIONS 1-9: 3

## 2020-08-18 NOTE — PROGRESS NOTES
HPI:    This is a 35 year old female patient,  who was called today for OB Intake visit. Patient reports positive pregnancy test at home.     Obstetrical history and OB Demographics updated to the best of this nurse's ability based on patient report. PHQ-9 depression screening and routine Domestic Abuse screening completed. All immediate questions and concerns answered.    Last menstrual period is reported as Patient's last menstrual period was 06/15/2020 (exact date). ANNA MARIE based on LMP is 3/22/2021.  Her cycles are regular.  Her last menstrual period was normal.   Since her LMP, she has experienced  nausea and fatigue.       Personal OB history includes: natural births 1, surgical births 2, G  5 and P  3  Previous OB Provider: Dr. Car Bello  Previous Delivering Clinic: [facility]  Release of Records: NA    Current delivery plan: GICH  Preferred OB Provider: Car Bello  Current Primary Care Provider: Car Bello  Pediatrician: Car Bello    Additional History: None    Have you travelled during the pregnancy?No  Have your sexual partner(s) travelled during the pregnancy?No      HISTORY:   Planned Pregnancy: No  Marital Status:   Occupation: Financial Aid  Living in Household: Spouse and Children    Father of the baby is involved.   Family and father of baby are supportive of current pregnancy.  Past Medical History of Father of Baby:No significant medical history    Past History:  Her past medical history is non-contributory.      She has a history of  preeclampsia    Since her last LMP she denies use of alcohol, tobacco and street drugs.    Pap smear history: YES - updated in Problem List and Health Maintenance accordingly    STD/STI history: No STD history    STD/STI symptoms: no noticeable symptoms     Past medical, surgical, social and family history were reviewed and updated in EPIC.    Medications reviewed by this nurse. Current medication list:  Current Outpatient Medications   Medication Sig  Dispense Refill     Prenatal Vit-Fe Fumarate-FA (PRENATAL MULTIVITAMIN  PLUS IRON) 27-1 MG TABS Take 1 tablet by mouth daily       amoxicillin (AMOXIL) 875 MG tablet Take 1 tablet (875 mg) by mouth 2 times daily 20 tablet 0     The following medications were recommended to be discontinued due to Pregnancy Category D status: NA  Patient informed to contact her primary care provider as soon as possible to discuss a safer alternative.    Risk factors:  Moderate and moderately severe risks (consult with OB/Gyn)  Previous fetal or  demise: No  History of  delivery: No  History of heart disease Class I: No  Severe anemia, unresponsive to iron therapy: No  Pelvic mass or neoplasm: No  Previous : No  Hyper/hypothyroidism: No  History of postpartum hemorrhage requiring transfusion:No  History of Placenta Accreta: No    High Risk (Pregnancy managed by OB/Gyn)  Multiple pregnancy: No  Pre-gestational diabetes: No  Chronic Hypertension: No  Renal Failure: No  Heart disease, class II or greater: No  Rh Isoimmunization: No  Chronic active hepatitis: No  Convulsive disorder, poorly controlled: No  Isoimmune thrombocytopenia: No  Pre-term premature rupture of membranes: No  Lupus or other autoimmune disorder: No  Human Immunodeficiency Virus: No        ASSESSMENT/PLAN:     No diagnosis found.    35 year old , 9w1d of pregnancy with ANNA MARIE of 3/22/2021, Alternate ANNA MARIE Entry    Per standing orders and scope of practice of this nurse, patient will have the following orders placed and completed prior to initial OB visit with the appropriate provider:    --early ultrasound for dating and viability ordered for 6+ weeks gestation based on LMP    --Quantitative Beta HCG and progesterone monitoring if indicated    Counseling given:     - Recommended weight gain for pregnancy: 25-35 lbs.   BMI < 18.5  28-40 lbs   18.5 - 24.9 25-35   25 - 29.9 15-25   > 30  < 15       PLAN/PATIENT INSTRUCTIONS:    Normal  exercise.  Normal sexual activity.  Prenatal vitamins.  Anticipated weight gain.    follow-up appointment with Dr. Car Bello for pre- care and take multivitamin or pre-juan antonio vitamins    Cecy Wells RN.................................................. 2020 9:11 AM

## 2020-08-27 ENCOUNTER — PRENATAL OFFICE VISIT (OUTPATIENT)
Dept: FAMILY MEDICINE | Facility: OTHER | Age: 35
End: 2020-08-27
Attending: FAMILY MEDICINE
Payer: COMMERCIAL

## 2020-08-27 ENCOUNTER — HOSPITAL ENCOUNTER (OUTPATIENT)
Dept: ULTRASOUND IMAGING | Facility: OTHER | Age: 35
End: 2020-08-27
Attending: FAMILY MEDICINE
Payer: COMMERCIAL

## 2020-08-27 VITALS
RESPIRATION RATE: 20 BRPM | DIASTOLIC BLOOD PRESSURE: 84 MMHG | TEMPERATURE: 98 F | HEART RATE: 88 BPM | SYSTOLIC BLOOD PRESSURE: 134 MMHG | BODY MASS INDEX: 24.05 KG/M2 | WEIGHT: 144.5 LBS

## 2020-08-27 DIAGNOSIS — Z32.01 POSITIVE PREGNANCY TEST: ICD-10-CM

## 2020-08-27 DIAGNOSIS — O02.1 MISSED AB: Primary | ICD-10-CM

## 2020-08-27 PROCEDURE — 76801 OB US < 14 WKS SINGLE FETUS: CPT

## 2020-08-27 PROCEDURE — 99213 OFFICE O/P EST LOW 20 MIN: CPT | Performed by: FAMILY MEDICINE

## 2020-08-27 ASSESSMENT — ENCOUNTER SYMPTOMS
ABDOMINAL PAIN: 0
CHILLS: 0
NERVOUS/ANXIOUS: 0
SHORTNESS OF BREATH: 0
COUGH: 0
FEVER: 0

## 2020-08-27 ASSESSMENT — PAIN SCALES - GENERAL: PAINLEVEL: NO PAIN (0)

## 2020-08-27 NOTE — PROGRESS NOTES
SUBJECTIVE:   Nursing Notes:   Lydia Chew LPN  2020  9:41 AM  Signed  Patient presents to clinic for ob physical.  Medication Reconciliation: complete    Lydia Chew LPN      Amanda Lockett is a 35 year old female who presents to clinic today for follow up after having an early OB ultrasound today to confirm dates.  By last menstrual period, she should be 10w3d today.  Her ultrasound this morning measured fetus at 9w3d.  No fetal heart tones were noted.  She has not had any bleeding, cramping, leaking fluid or any other concerns.  She tends not to have much nausea/vomiting with her pregnancies and has not had anything different this time around.  She might have had a little less breast tenderness in the past week, but otherwise has not noted anything different.  She has never had any difficulties with bleeding problems or with anesthesia intolerance in the past.  She is otherwise feeling well.  Her blood type is A positive.    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       Encounter for counseling regarding contraception 2011     Priority: Medium     Contact dermatitis 2011     Priority: Medium     Overview:   Secondary to chloro-prep       Past Medical History:   Diagnosis Date     Benign neoplasm of right breast     2016,Proven by core needle biopsy 2016.     Personal history of other diseases of the female genital tract     G5, , s/p VAVD (baby weighed 8#15 oz);  for transverse presentation and fetal macrosomia (weight 9 lbs. 2 oz.), elective repeat , missed ab x 1 with suction D&C, now with another missed ab.      Past Surgical History:   Procedure Laterality Date       SECTION      09/15/2011, for breech presentation      SECTION      2015,Elective repeat      DILATION AND CURETTAGE SUCTION N/A 2018    Procedure: DILATION AND CURETTAGE SUCTION;  Suction Dilation and Curettage.    .;  Surgeon: Shady Wells MD;  Location: [facility] OR     MYRINGOTOMY, INSERT TUBE, COMBINED      ,PE tubes x 3, last placement when she was in 3rd grade     OTHER SURGICAL HISTORY      ,30744,ORAL SURGERY     Family History   Problem Relation Age of Onset     Thyroid Disease Mother         Thyroid Disease,hypothyroid     Hypertension Mother         Hypertension     Breast Cancer Paternal Grandfather         Cancer-breast,breast cancer in 70s;  of different cancer     Cancer Maternal Grandfather         Cancer,prostate cancer     Other - See Comments Other         No family history of anesthesia intolerance or bleeding/clotting disorders     Breast Cancer Maternal Aunt         Cancer-breast     Breast Cancer Paternal Aunt         Cancer-breast     Colon Cancer No family hx of         Cancer-colon     Ovarian Cancer No family hx of         Cancer-ovarian     Pancreatic Cancer No family hx of         Cancer-pancreatic     Social History     Tobacco Use     Smoking status: Never Smoker     Smokeless tobacco: Never Used   Substance Use Topics     Alcohol use: No     Alcohol/week: 0.0 standard drinks     Social History     Social History Narrative    Graduated from [email or web address] in [place] in 2006 with degree in business.  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   to Howard Lockett; he works for [employer].        Chris Lockett - Son - 10/20/09;     Randy Lockett - son - 9/15/11;     Alphonse Lockett - son - 1/23/15.      Lydia Rodriguez - Mother.     Current Outpatient Medications   Medication Sig Dispense Refill     Prenatal Vit-Fe Fumarate-FA (PRENATAL MULTIVITAMIN  PLUS IRON) 27-1 MG TABS Take 1 tablet by mouth daily        Allergies   Allergen Reactions     Chlorhexidine Rash     Chloraprep used prior to Day Surgery       Review of Systems   Constitutional: Negative for chills and fever.   Respiratory: Negative for cough and shortness of breath.    Cardiovascular: Negative for peripheral edema.   Gastrointestinal: Negative for abdominal pain.   Genitourinary: Negative for vaginal bleeding.   Psychiatric/Behavioral: Negative for mood changes. The patient is not nervous/anxious.         OBJECTIVE:     /84 (BP Location: Right arm, Patient Position: Sitting, Cuff Size: Adult Regular)   Pulse 88   Temp 98  F (36.7  C) (Tympanic)   Resp 20   Wt 65.5 kg (144 lb 8 oz)   LMP 06/15/2020 (Exact Date)   Breastfeeding No   BMI 24.05 kg/m    Body mass index is 24.05 kg/m .  Physical Exam    PHQ-2 Score:     PHQ-2 ( 1999 Pf[employer]) 8/27/2020 11/19/2018   Q1: Little interest or pleasure in doing things 0 0   Q2: Feeling down, depressed or hopeless 0 0   PHQ-2 Score 0 0     I personally reviewed results withpatient as listed below:   Diagnostic Test Results:  ultrasound report reviewed.  Fetal pole measured 9w3d.  No FHTs seen.     ASSESSMENT/PLAN:       ICD-10-CM    1. Missed ab  O02.1 OB/GYN REFERRAL       1.  Discussed with patient.  She has had an incomplete miscarriage 2 years ago.  She thinks she had waited almost 3 weeks and it did not pass on its own.  She was starting to run a fever at that time and suction D&C was completed.  She would prefer to wait a few days before treatment if possible, which is reasonable.  Discussed follow up if fever, profuse bleeding, pain that she feels is uncharacteristic or any other concerns.  She is set up to see Dr. Shady Wells, who performed her last D&C.  As above, blood type is A positive.    Eugenia Araujo MD  [facility] AND [facility]    Portions of this dictation were created using the Dragon Nuance voice recognition system. Proofreading was completed but there may be errors  in text.

## 2020-08-27 NOTE — NURSING NOTE
Patient presents to clinic for ob physical.  Medication Reconciliation: complete    Lydia Chew, ANDREWN

## 2020-08-28 ENCOUNTER — ANESTHESIA EVENT (OUTPATIENT)
Dept: SURGERY | Facility: OTHER | Age: 35
End: 2020-08-28
Payer: COMMERCIAL

## 2020-08-28 ENCOUNTER — ALLIED HEALTH/NURSE VISIT (OUTPATIENT)
Dept: FAMILY MEDICINE | Facility: OTHER | Age: 35
End: 2020-08-28
Attending: OBSTETRICS & GYNECOLOGY
Payer: COMMERCIAL

## 2020-08-28 ENCOUNTER — OFFICE VISIT (OUTPATIENT)
Dept: OBGYN | Facility: OTHER | Age: 35
End: 2020-08-28
Attending: FAMILY MEDICINE
Payer: COMMERCIAL

## 2020-08-28 VITALS
OXYGEN SATURATION: 98 % | HEART RATE: 96 BPM | DIASTOLIC BLOOD PRESSURE: 66 MMHG | SYSTOLIC BLOOD PRESSURE: 118 MMHG | RESPIRATION RATE: 16 BRPM | BODY MASS INDEX: 23.85 KG/M2 | WEIGHT: 143.3 LBS

## 2020-08-28 DIAGNOSIS — Z01.812 PRE-PROCEDURE LAB EXAM: ICD-10-CM

## 2020-08-28 DIAGNOSIS — O02.1 MISSED AB: ICD-10-CM

## 2020-08-28 DIAGNOSIS — O02.1 MISSED AB: Primary | ICD-10-CM

## 2020-08-28 PROCEDURE — 99207 ZZC NO CHARGE NURSE ONLY: CPT

## 2020-08-28 PROCEDURE — U0003 INFECTIOUS AGENT DETECTION BY NUCLEIC ACID (DNA OR RNA); SEVERE ACUTE RESPIRATORY SYNDROME CORONAVIRUS 2 (SARS-COV-2) (CORONAVIRUS DISEASE [COVID-19]), AMPLIFIED PROBE TECHNIQUE, MAKING USE OF HIGH THROUGHPUT TECHNOLOGIES AS DESCRIBED BY CMS-2020-01-R: HCPCS | Mod: ZL | Performed by: OBSTETRICS & GYNECOLOGY

## 2020-08-28 PROCEDURE — 99214 OFFICE O/P EST MOD 30 MIN: CPT | Performed by: OBSTETRICS & GYNECOLOGY

## 2020-08-28 PROCEDURE — C9803 HOPD COVID-19 SPEC COLLECT: HCPCS

## 2020-08-28 ASSESSMENT — PAIN SCALES - GENERAL: PAINLEVEL: NO PAIN (0)

## 2020-08-28 NOTE — H&P (VIEW-ONLY)
Amanda 35-year-old G5, P3 referred by [facility] for discussion of miscarriage.  She has 3 children at home.  2 years ago I became involved as she had a stay be that became septic.  We did a suction D&C on her.  Now was 10-1/2 weeks by dates.  Yesterday had an ultrasound which showed a 9-week 3-day fetal pole with no cardiac activity.  Denies any bleeding or spotting.  Would like to consider D&C fairly soon.  Blood type a positive.    See physical exam yesterday for past medical history, past surgical history allergies and review of systems.    Physical exam:  Patient alert orientated x3  Blood pressure 118/66, pulse 96, afebrile  Clear to auscultation  Cardiac regular rate and rhythm    Ultrasound is reviewed    Assessment:  5 para 3 with a 9 to 10-week size missed AB.  Previous miscarriage with expectant management resulted in developing a septic AB.    Plan: Options reviewed.  Would like to proceed with a suction D&C soon.  We will make arrangements for early next week.  All questions answered

## 2020-08-30 LAB
LABORATORY COMMENT REPORT: NORMAL
SARS-COV-2 RNA SPEC QL NAA+PROBE: NEGATIVE
SARS-COV-2 RNA SPEC QL NAA+PROBE: NORMAL
SPECIMEN SOURCE: NORMAL
SPECIMEN SOURCE: NORMAL

## 2020-08-31 ENCOUNTER — ANESTHESIA (OUTPATIENT)
Dept: SURGERY | Facility: OTHER | Age: 35
End: 2020-08-31
Payer: COMMERCIAL

## 2020-08-31 ENCOUNTER — HOSPITAL ENCOUNTER (OUTPATIENT)
Facility: OTHER | Age: 35
Discharge: HOME | End: 2020-08-31
Attending: OBSTETRICS & GYNECOLOGY | Admitting: OBSTETRICS & GYNECOLOGY
Payer: COMMERCIAL

## 2020-08-31 VITALS
HEART RATE: 78 BPM | WEIGHT: 143.3 LBS | OXYGEN SATURATION: 96 % | BODY MASS INDEX: 23.88 KG/M2 | RESPIRATION RATE: 18 BRPM | SYSTOLIC BLOOD PRESSURE: 133 MMHG | TEMPERATURE: 97.9 F | DIASTOLIC BLOOD PRESSURE: 78 MMHG | HEIGHT: 65 IN

## 2020-08-31 DIAGNOSIS — O02.1 MISSED AB: ICD-10-CM

## 2020-08-31 LAB — HGB BLD-MCNC: 12.4 G/DL (ref 11.7–15.7)

## 2020-08-31 PROCEDURE — 85018 HEMOGLOBIN: CPT | Performed by: OBSTETRICS & GYNECOLOGY

## 2020-08-31 PROCEDURE — 25000128 H RX IP 250 OP 636: Performed by: OBSTETRICS & GYNECOLOGY

## 2020-08-31 PROCEDURE — 27210794 ZZH OR GENERAL SUPPLY STERILE: Performed by: OBSTETRICS & GYNECOLOGY

## 2020-08-31 PROCEDURE — 59820 CARE OF MISCARRIAGE: CPT | Performed by: NURSE ANESTHETIST, CERTIFIED REGISTERED

## 2020-08-31 PROCEDURE — 88305 TISSUE EXAM BY PATHOLOGIST: CPT

## 2020-08-31 PROCEDURE — 40000306 ZZH STATISTIC PRE PROC ASSESS II: Performed by: OBSTETRICS & GYNECOLOGY

## 2020-08-31 PROCEDURE — 59820 CARE OF MISCARRIAGE: CPT | Performed by: OBSTETRICS & GYNECOLOGY

## 2020-08-31 PROCEDURE — 36415 COLL VENOUS BLD VENIPUNCTURE: CPT | Performed by: OBSTETRICS & GYNECOLOGY

## 2020-08-31 PROCEDURE — 36000050 ZZH SURGERY LEVEL 2 1ST 30 MIN: Performed by: OBSTETRICS & GYNECOLOGY

## 2020-08-31 PROCEDURE — 88182 CELL MARKER STUDY: CPT

## 2020-08-31 PROCEDURE — 37000008 ZZH ANESTHESIA TECHNICAL FEE, 1ST 30 MIN: Performed by: OBSTETRICS & GYNECOLOGY

## 2020-08-31 PROCEDURE — 25000128 H RX IP 250 OP 636: Performed by: NURSE ANESTHETIST, CERTIFIED REGISTERED

## 2020-08-31 PROCEDURE — 25000125 ZZHC RX 250: Performed by: NURSE ANESTHETIST, CERTIFIED REGISTERED

## 2020-08-31 PROCEDURE — 25800030 ZZH RX IP 258 OP 636: Performed by: NURSE ANESTHETIST, CERTIFIED REGISTERED

## 2020-08-31 PROCEDURE — 71000027 ZZH RECOVERY PHASE 2 EACH 15 MINS: Performed by: OBSTETRICS & GYNECOLOGY

## 2020-08-31 RX ORDER — KETOROLAC TROMETHAMINE 30 MG/ML
30 INJECTION, SOLUTION INTRAMUSCULAR; INTRAVENOUS ONCE
Status: COMPLETED | OUTPATIENT
Start: 2020-08-31 | End: 2020-08-31

## 2020-08-31 RX ORDER — HYDROCODONE BITARTRATE AND ACETAMINOPHEN 5; 325 MG/1; MG/1
1 TABLET ORAL
Status: DISCONTINUED | OUTPATIENT
Start: 2020-08-31 | End: 2020-08-31 | Stop reason: HOSPADM

## 2020-08-31 RX ORDER — ONDANSETRON 4 MG/1
4 TABLET, ORALLY DISINTEGRATING ORAL EVERY 30 MIN PRN
Status: DISCONTINUED | OUTPATIENT
Start: 2020-08-31 | End: 2020-08-31 | Stop reason: HOSPADM

## 2020-08-31 RX ORDER — IBUPROFEN 600 MG/1
600 TABLET, FILM COATED ORAL
Status: DISCONTINUED | OUTPATIENT
Start: 2020-08-31 | End: 2020-08-31

## 2020-08-31 RX ORDER — ONDANSETRON 2 MG/ML
INJECTION INTRAMUSCULAR; INTRAVENOUS PRN
Status: DISCONTINUED | OUTPATIENT
Start: 2020-08-31 | End: 2020-08-31

## 2020-08-31 RX ORDER — ONDANSETRON 4 MG/1
4 TABLET, ORALLY DISINTEGRATING ORAL
Status: DISCONTINUED | OUTPATIENT
Start: 2020-08-31 | End: 2020-08-31 | Stop reason: HOSPADM

## 2020-08-31 RX ORDER — ONDANSETRON 2 MG/ML
4 INJECTION INTRAMUSCULAR; INTRAVENOUS EVERY 30 MIN PRN
Status: DISCONTINUED | OUTPATIENT
Start: 2020-08-31 | End: 2020-08-31 | Stop reason: HOSPADM

## 2020-08-31 RX ORDER — IBUPROFEN 600 MG/1
600 TABLET, FILM COATED ORAL EVERY 6 HOURS PRN
Qty: 30 TABLET | Refills: 0 | Status: SHIPPED | OUTPATIENT
Start: 2020-08-31 | End: 2020-09-10

## 2020-08-31 RX ORDER — CEFAZOLIN SODIUM 1 G/50ML
1 INJECTION, SOLUTION INTRAVENOUS SEE ADMIN INSTRUCTIONS
Status: DISCONTINUED | OUTPATIENT
Start: 2020-08-31 | End: 2020-08-31 | Stop reason: HOSPADM

## 2020-08-31 RX ORDER — SODIUM CHLORIDE, SODIUM LACTATE, POTASSIUM CHLORIDE, CALCIUM CHLORIDE 600; 310; 30; 20 MG/100ML; MG/100ML; MG/100ML; MG/100ML
INJECTION, SOLUTION INTRAVENOUS CONTINUOUS
Status: DISCONTINUED | OUTPATIENT
Start: 2020-08-31 | End: 2020-08-31 | Stop reason: HOSPADM

## 2020-08-31 RX ORDER — FENTANYL CITRATE 50 UG/ML
INJECTION, SOLUTION INTRAMUSCULAR; INTRAVENOUS PRN
Status: DISCONTINUED | OUTPATIENT
Start: 2020-08-31 | End: 2020-08-31

## 2020-08-31 RX ORDER — PROPOFOL 10 MG/ML
INJECTION, EMULSION INTRAVENOUS CONTINUOUS PRN
Status: DISCONTINUED | OUTPATIENT
Start: 2020-08-31 | End: 2020-08-31

## 2020-08-31 RX ORDER — HYDROMORPHONE HYDROCHLORIDE 1 MG/ML
.3-.5 INJECTION, SOLUTION INTRAMUSCULAR; INTRAVENOUS; SUBCUTANEOUS EVERY 10 MIN PRN
Status: DISCONTINUED | OUTPATIENT
Start: 2020-08-31 | End: 2020-08-31 | Stop reason: HOSPADM

## 2020-08-31 RX ORDER — MEPERIDINE HYDROCHLORIDE 50 MG/ML
12.5 INJECTION INTRAMUSCULAR; INTRAVENOUS; SUBCUTANEOUS
Status: DISCONTINUED | OUTPATIENT
Start: 2020-08-31 | End: 2020-08-31 | Stop reason: HOSPADM

## 2020-08-31 RX ORDER — CEFAZOLIN SODIUM 2 G/100ML
2 INJECTION, SOLUTION INTRAVENOUS
Status: COMPLETED | OUTPATIENT
Start: 2020-08-31 | End: 2020-08-31

## 2020-08-31 RX ORDER — LIDOCAINE HYDROCHLORIDE 10 MG/ML
INJECTION, SOLUTION INFILTRATION; PERINEURAL PRN
Status: DISCONTINUED | OUTPATIENT
Start: 2020-08-31 | End: 2020-08-31

## 2020-08-31 RX ORDER — FENTANYL CITRATE 50 UG/ML
25-50 INJECTION, SOLUTION INTRAMUSCULAR; INTRAVENOUS
Status: DISCONTINUED | OUTPATIENT
Start: 2020-08-31 | End: 2020-08-31 | Stop reason: HOSPADM

## 2020-08-31 RX ORDER — IBUPROFEN 600 MG/1
600 TABLET, FILM COATED ORAL
Status: DISCONTINUED | OUTPATIENT
Start: 2020-08-31 | End: 2020-08-31 | Stop reason: HOSPADM

## 2020-08-31 RX ORDER — LIDOCAINE 40 MG/G
CREAM TOPICAL
Status: DISCONTINUED | OUTPATIENT
Start: 2020-08-31 | End: 2020-08-31 | Stop reason: HOSPADM

## 2020-08-31 RX ORDER — NALOXONE HYDROCHLORIDE 0.4 MG/ML
.1-.4 INJECTION, SOLUTION INTRAMUSCULAR; INTRAVENOUS; SUBCUTANEOUS
Status: DISCONTINUED | OUTPATIENT
Start: 2020-08-31 | End: 2020-08-31 | Stop reason: HOSPADM

## 2020-08-31 RX ADMIN — FENTANYL CITRATE 100 MCG: 50 INJECTION, SOLUTION INTRAMUSCULAR; INTRAVENOUS at 09:24

## 2020-08-31 RX ADMIN — ONDANSETRON 4 MG: 2 INJECTION INTRAMUSCULAR; INTRAVENOUS at 09:28

## 2020-08-31 RX ADMIN — MIDAZOLAM 2 MG: 1 INJECTION INTRAMUSCULAR; INTRAVENOUS at 09:24

## 2020-08-31 RX ADMIN — KETOROLAC TROMETHAMINE 30 MG: 30 INJECTION, SOLUTION INTRAMUSCULAR at 08:27

## 2020-08-31 RX ADMIN — CEFAZOLIN SODIUM 2 G: 2 INJECTION, SOLUTION INTRAVENOUS at 09:23

## 2020-08-31 RX ADMIN — PROPOFOL 150 MCG/KG/MIN: 10 INJECTION, EMULSION INTRAVENOUS at 09:28

## 2020-08-31 RX ADMIN — SODIUM CHLORIDE, POTASSIUM CHLORIDE, SODIUM LACTATE AND CALCIUM CHLORIDE: 600; 310; 30; 20 INJECTION, SOLUTION INTRAVENOUS at 08:27

## 2020-08-31 RX ADMIN — ONDANSETRON 4 MG: 2 INJECTION INTRAMUSCULAR; INTRAVENOUS at 10:33

## 2020-08-31 RX ADMIN — LIDOCAINE HYDROCHLORIDE 100 MG: 10 INJECTION, SOLUTION INFILTRATION; PERINEURAL at 09:28

## 2020-08-31 ASSESSMENT — MIFFLIN-ST. JEOR: SCORE: 1345.88

## 2020-08-31 NOTE — ANESTHESIA POSTPROCEDURE EVALUATION
Patient: Amanda Estevesler    Procedure(s):  DILATION AND CURETTAGE, UTERUS, USING SUCTION    Diagnosis:Missed ab [O02.1]  Diagnosis Additional Information: No value filed.    Anesthesia Type:  MAC    Note:  Anesthesia Post Evaluation    Patient location during evaluation: Bedside  Patient participation: Able to fully participate in evaluation  Level of consciousness: awake and alert  Pain management: adequate  Airway patency: patent  Cardiovascular status: acceptable  Respiratory status: acceptable  Hydration status: acceptable  PONV: none     Anesthetic complications: None          Last vitals:  Vitals:    08/31/20 1100 08/31/20 1115 08/31/20 1130   BP:   133/78   Pulse:   78   Resp:   18   Temp:      SpO2: 98% 98% 96%         Electronically Signed By: NICOL Larson CRNA  August 31, 2020  11:53 AM

## 2020-08-31 NOTE — BRIEF OP NOTE
[facility]    Brief Operative Note    Pre-operative diagnosis: Missed ab [O02.1]  Post-operative diagnosis missed ab    Procedure: Procedure(s):  DILATION AND CURETTAGE, UTERUS, USING SUCTION  Surgeon: Surgeon(s) and Role:     * Shady Wells MD - Primary  Anesthesia: Monitor Anesthesia Care   Estimated blood loss: Less than 100 ml  Drains: None  Specimens:   ID Type Source Tests Collected by Time Destination   A :  Products of Conception Placenta/ Fragments/ Fetus from Miscarriage or Termination SURGICAL PATHOLOGY EXAM Shady Wells MD 8/31/2020  9:42 AM      Findings:   None.  Complications: None.  Implants: * No implants in log *

## 2020-08-31 NOTE — OP NOTE
Preoperative Diagnosis: Missed AB  Postoperative Diagnosis: Same, stenotic cervix  Procedure: Suction D&C  Surgeon: Shady Wells MD  Assistant Surgeon:     Clinical History: 35-year-old  5 para 3 of [facility]'s who was diagnosed last week with her second miscarriage.  10 weeks by dates.  9-week gestation without fetal heart tones.  2 years prior had had a miscarriage where she had planned expectant management and unfortunately became infected requiring an urgent D&C with antibiotic therapy.  Declines expectant management at this time.  Rh+    Findings: Cervix fairly stenotic.  Uterus 10 weeks size.  Normal amount of products of conception removed.  Bleeding minimal.    Operative Report: Patient was taken to the OR, received a MAC anesthesia, was prepped and draped in a dorsolithotomy position.  Timeout performed.  Weighted speculum placed with a tenaculum anteriorly on the cervix.  Initially we tried to dilate the cervix with Hegar dilators but and into a fair amount of stenosis.  We used graduated plastic dilator and were able to identify the cervical canal.  We then dilated up to 9 mm using the Hegar dilators.  Using a curved #9 suction curette at routine settings we systematically curetted the cavity of a large amount of products of conception.  Gentle curettage was continued until no further tissue returned and it was felt that the cavity was empty.  Bleeding was then scant.  Stitch of 3-0 chromic required on the cervix for hemostasis.  Procedure terminated    Patient tolerated the procedure well and was taken to recovery in stable condition:    EBL: Less than 100 cc    Complications: None apparent

## 2020-08-31 NOTE — DISCHARGE INSTRUCTIONS
[facility]  Adult Discharge Orders & Instructions    ________________________________________________________________          For 12 hours after surgery  1. Get plenty of rest.  A responsible adult must stay with you for at least 12 hours after you leave the hospital.   2. You may feel lightheaded.  IF so, sit for a few minutes before standing.  Have someone help you get up.   3. You may have a slight fever. Call the doctor if your fever is over 101 F (38.3 C) (taken under the tongue) or lasts longer than 24 hours.  4. You may have a dry mouth, a sore throat, muscle aches or trouble sleeping.  These should go away after 24 hours.  5. Do not make important or legal decisions.  6.   Do not drive or use heavy equipment.  If you have weakness or tingling, don't drive or use heavy equipment until this feeling goes away.    To contact a doctor, call   [phone]_______________________[facility]  Adult Discharge Orders & Instructions    ________________________________________________________________          For 12 hours after surgery  6. Get plenty of rest.  A responsible adult must stay with you for at least 12 hours after you leave the hospital.   7. You may feel lightheaded.  IF so, sit for a few minutes before standing.  Have someone help you get up.   8. You may have a slight fever. Call the doctor if your fever is over 101 F (38.3 C) (taken under the tongue) or lasts longer than 24 hours.  9. You may have a dry mouth, a sore throat, muscle aches or trouble sleeping.  These should go away after 24 hours.  10. Do not make important or legal decisions.  6.   Do not drive or use heavy equipment.  If you have weakness or tingling, don't drive or use heavy equipment until this feeling goes away.    To contact a doctor, call   [phone]_______________________

## 2020-08-31 NOTE — ANESTHESIA CARE TRANSFER NOTE
Patient: Amanda Estevesler    Procedure(s):  DILATION AND CURETTAGE, UTERUS, USING SUCTION    Diagnosis: Missed ab [O02.1]  Diagnosis Additional Information: No value filed.    Anesthesia Type:   MAC     Note:  Airway :Room Air  Patient transferred to:Phase II  Handoff Report: Identifed the Patient, Identified the Reponsible Provider, Reviewed the pertinent medical history, Discussed the surgical course, Reviewed Intra-OP anesthesia mangement and issues during anesthesia, Set expectations for post-procedure period and Allowed opportunity for questions and acknowledgement of understanding      Vitals: (Last set prior to Anesthesia Care Transfer)    CRNA VITALS  8/31/2020 0922 - 8/31/2020 0952      8/31/2020             Resp Rate (observed):  (!) 1    Resp Rate (set):  10                Electronically Signed By: Ricky Fuentes CRNA, APRN CRNA  August 31, 2020  9:52 AM

## 2020-08-31 NOTE — ANESTHESIA PREPROCEDURE EVALUATION
Anesthesia Pre-Procedure Evaluation    Patient: Amanda Lockett   MRN: [id number] : 1985          Preoperative Diagnosis: Missed ab [O02.1]    Procedure(s):  DILATION AND CURETTAGE, UTERUS, USING SUCTION    Past Medical History:   Diagnosis Date     Benign neoplasm of right breast     2016,Proven by core needle biopsy 2016.     Personal history of other diseases of the female genital tract     G5, , s/p VAVD (baby weighed 8#15 oz);  for transverse presentation and fetal macrosomia (weight 9 lbs. 2 oz.), elective repeat , missed ab x 1 with suction D&C, now with another missed ab.     Past Surgical History:   Procedure Laterality Date      SECTION      09/15/2011, for breech presentation      SECTION      2015,Elective repeat      DILATION AND CURETTAGE SUCTION N/A 2018    Procedure: DILATION AND CURETTAGE SUCTION;  Suction Dilation and Curettage.    .;  Surgeon: Shady Wells MD;  Location: [facility] OR     MYRINGOTOMY, INSERT TUBE, COMBINED      ,PE tubes x 3, last placement when she was in 3rd grade     OTHER SURGICAL HISTORY      ,71652,ORAL SURGERY       Anesthesia Evaluation     . Pt has had prior anesthetic.     No history of anesthetic complications          ROS/MED HX    ENT/Pulmonary:  - neg pulmonary ROS     Neurologic:  - neg neurologic ROS     Cardiovascular:  - neg cardiovascular ROS       METS/Exercise Tolerance:  >4 METS   Hematologic:  - neg hematologic  ROS       Musculoskeletal:  - neg musculoskeletal ROS       GI/Hepatic:  - neg GI/hepatic ROS       Renal/Genitourinary:  - ROS Renal section negative       Endo:  - neg endo ROS       Psychiatric:  - neg psychiatric ROS       Infectious Disease:  - neg infectious disease ROS       Malignancy:      - no malignancy   Other: Comment: Fetal Demise    (+) No chance of pregnancy   - neg other ROS                      Physical Exam  Normal systems: cardiovascular,  "pulmonary and dental    Airway   Mallampati: I  TM distance: >3 FB  Neck ROM: full    Dental     Cardiovascular   Rhythm and rate: regular and normal      Pulmonary             Lab Results   Component Value Date    WBC 11.8 (H) 09/28/2018    HGB 12.4 08/31/2020    HCT 39.4 09/28/2018     09/28/2018     10/09/2015    POTASSIUM 3.5 10/09/2015    CHLORIDE 103 10/09/2015    CO2 25 10/09/2015    BUN 14 10/09/2015    CR 0.66 (L) 10/09/2015     10/09/2015    DAGOBERTO 9.7 10/09/2015    MAG 1.9 08/09/2016    ALBUMIN 4.4 10/09/2015    PROTTOTAL 7.1 10/09/2015    ALKPHOS 74 10/09/2015    BILITOTAL 0.3 10/09/2015    HCG Positive (A) 08/20/2018    HCGS Positive (A) 09/20/2018       Preop Vitals  BP Readings from Last 3 Encounters:   08/31/20 133/89   08/28/20 118/66   08/27/20 134/84    Pulse Readings from Last 3 Encounters:   08/31/20 92   08/28/20 96   08/27/20 88      Resp Readings from Last 3 Encounters:   08/31/20 14   08/28/20 16   08/27/20 20    SpO2 Readings from Last 3 Encounters:   08/31/20 97%   08/28/20 98%   09/28/18 100%      Temp Readings from Last 1 Encounters:   08/31/20 98.7  F (37.1  C) (Tympanic)    Ht Readings from Last 1 Encounters:   08/31/20 1.651 m (5' 5\")      Wt Readings from Last 1 Encounters:   08/31/20 65 kg (143 lb 4.8 oz)    Estimated body mass index is 23.85 kg/m  as calculated from the following:    Height as of this encounter: 1.651 m (5' 5\").    Weight as of this encounter: 65 kg (143 lb 4.8 oz).       Anesthesia Plan      History & Physical Review      ASA Status:  1 .    NPO Status:  > 8 hours    Plan for MAC with Intravenous induction. Maintenance will be Balanced.    PONV prophylaxis:  Ondansetron (or other 5HT-3)  Risks, benefits and alternatives discussed and would like to proceed. General anesthesia ok if indicated.           Postoperative Care  Postoperative pain management:  IV analgesics and Multi-modal analgesia.      Consents  Anesthetic plan, risks, benefits and " alternatives discussed with:  Patient and Spouse.  Use of blood products discussed: No .   .                 NICOL Larson CRNA

## 2020-08-31 NOTE — INTERVAL H&P NOTE
The History and Physical is reviewed from 08/28/20.  No changes or additions.  No bleeding or cramping  Chest: CTA  CV: RRR  Surgery reviewed in detail.  All questions answered

## 2020-09-10 ENCOUNTER — OFFICE VISIT (OUTPATIENT)
Dept: OBGYN | Facility: OTHER | Age: 35
End: 2020-09-10
Attending: OBSTETRICS & GYNECOLOGY
Payer: COMMERCIAL

## 2020-09-10 VITALS
DIASTOLIC BLOOD PRESSURE: 72 MMHG | HEART RATE: 81 BPM | SYSTOLIC BLOOD PRESSURE: 108 MMHG | RESPIRATION RATE: 18 BRPM | BODY MASS INDEX: 23.8 KG/M2 | OXYGEN SATURATION: 98 % | WEIGHT: 143 LBS

## 2020-09-10 DIAGNOSIS — O02.1 MISSED AB: Primary | ICD-10-CM

## 2020-09-10 DIAGNOSIS — Z09 POSTOP CHECK: ICD-10-CM

## 2020-09-10 LAB — TSH SERPL DL<=0.05 MIU/L-ACNC: 3.31 IU/ML (ref 0.34–5.6)

## 2020-09-10 PROCEDURE — 99024 POSTOP FOLLOW-UP VISIT: CPT | Performed by: OBSTETRICS & GYNECOLOGY

## 2020-09-10 PROCEDURE — 84443 ASSAY THYROID STIM HORMONE: CPT | Mod: ZL | Performed by: OBSTETRICS & GYNECOLOGY

## 2020-09-10 PROCEDURE — 36415 COLL VENOUS BLD VENIPUNCTURE: CPT | Mod: ZL | Performed by: OBSTETRICS & GYNECOLOGY

## 2020-09-10 ASSESSMENT — PAIN SCALES - GENERAL: PAINLEVEL: NO PAIN (0)

## 2020-09-10 NOTE — PROGRESS NOTES
Amanda is seen a week and a half out from her suction D&C for missed AB.  The pathology report on this was quite delayed as there was some suggestion of abnormal villous morphology raising the question of a molar pregnancy.  Fetal tissues were present which rules out a complete mole.  DNA ploidy studies were then done showing this to be dyploid thus ruling out a partial mole.  New    I did discuss this further with pathology and they confirmed that there is no ongoing concern for GTD.    The patient is doing well.  Bleeding is scant.  We reviewed the above.  Recommended waiting 1 month before attempting pregnancy.  Also discussed with her at the time of the surgery her cervix was white scarred possibly from her septic miscarriage the time before.  Potentially that contributed to her difficulty in getting pregnant.  Did offer her the option of considering a hysterosalpingogram to assess tubal status in the future.

## 2020-09-10 NOTE — NURSING NOTE
"Chief Complaint   Patient presents with     Follow Up     misscarriage follow up       Initial /72 (BP Location: Right arm, Patient Position: Sitting, Cuff Size: Adult Regular)   Pulse 81   Resp 18   Wt 64.9 kg (143 lb)   LMP 06/15/2020 (Exact Date)   SpO2 98%   Breastfeeding No   BMI 23.80 kg/m   Estimated body mass index is 23.8 kg/m  as calculated from the following:    Height as of 8/31/20: 1.651 m (5' 5\").    Weight as of this encounter: 64.9 kg (143 lb).  Medication Reconciliation: complete    Karina Rubio LPN  "

## 2020-12-14 ENCOUNTER — HEALTH MAINTENANCE LETTER (OUTPATIENT)
Age: 35
End: 2020-12-14

## 2021-04-18 ENCOUNTER — HEALTH MAINTENANCE LETTER (OUTPATIENT)
Age: 36
End: 2021-04-18

## 2021-10-02 ENCOUNTER — HEALTH MAINTENANCE LETTER (OUTPATIENT)
Age: 36
End: 2021-10-02

## 2022-01-13 ENCOUNTER — MYC MEDICAL ADVICE (OUTPATIENT)
Dept: FAMILY MEDICINE | Facility: OTHER | Age: 37
End: 2022-01-13
Payer: COMMERCIAL

## 2022-01-19 ENCOUNTER — OFFICE VISIT (OUTPATIENT)
Dept: FAMILY MEDICINE | Facility: OTHER | Age: 37
End: 2022-01-19
Attending: PHYSICIAN ASSISTANT
Payer: COMMERCIAL

## 2022-01-19 VITALS
RESPIRATION RATE: 16 BRPM | DIASTOLIC BLOOD PRESSURE: 90 MMHG | WEIGHT: 134 LBS | OXYGEN SATURATION: 98 % | SYSTOLIC BLOOD PRESSURE: 142 MMHG | TEMPERATURE: 99.1 F | HEART RATE: 130 BPM | BODY MASS INDEX: 22.3 KG/M2

## 2022-01-19 DIAGNOSIS — R00.0 SINUS TACHYCARDIA: Primary | ICD-10-CM

## 2022-01-19 DIAGNOSIS — R06.02 SHORTNESS OF BREATH: ICD-10-CM

## 2022-01-19 DIAGNOSIS — R79.89 LOW TSH LEVEL: ICD-10-CM

## 2022-01-19 DIAGNOSIS — E05.90 HYPERTHYROIDISM: ICD-10-CM

## 2022-01-19 LAB
ALBUMIN SERPL-MCNC: 4.3 G/DL (ref 3.5–5.7)
ALP SERPL-CCNC: 48 U/L (ref 34–104)
ALT SERPL W P-5'-P-CCNC: 59 U/L (ref 7–52)
ANION GAP SERPL CALCULATED.3IONS-SCNC: 9 MMOL/L (ref 3–14)
AST SERPL W P-5'-P-CCNC: 25 U/L (ref 13–39)
ATRIAL RATE - MUSE: 112 BPM
BASOPHILS # BLD AUTO: 0 10E3/UL (ref 0–0.2)
BASOPHILS NFR BLD AUTO: 0 %
BILIRUB SERPL-MCNC: 0.3 MG/DL (ref 0.3–1)
BUN SERPL-MCNC: 15 MG/DL (ref 7–25)
CALCIUM SERPL-MCNC: 9.9 MG/DL (ref 8.6–10.3)
CHLORIDE BLD-SCNC: 104 MMOL/L (ref 98–107)
CO2 SERPL-SCNC: 24 MMOL/L (ref 21–31)
CREAT SERPL-MCNC: 0.54 MG/DL (ref 0.6–1.2)
D DIMER PPP FEU-MCNC: 0.47 UG/ML FEU (ref 0–0.5)
DIASTOLIC BLOOD PRESSURE - MUSE: NORMAL MMHG
EOSINOPHIL # BLD AUTO: 0.1 10E3/UL (ref 0–0.7)
EOSINOPHIL NFR BLD AUTO: 1 %
ERYTHROCYTE [DISTWIDTH] IN BLOOD BY AUTOMATED COUNT: 12.9 % (ref 10–15)
FERRITIN SERPL-MCNC: 19 NG/ML (ref 24–336)
GFR SERPL CREATININE-BSD FRML MDRD: >90 ML/MIN/1.73M2
GLUCOSE BLD-MCNC: 103 MG/DL (ref 70–105)
HCT VFR BLD AUTO: 41.7 % (ref 35–47)
HGB BLD-MCNC: 13.7 G/DL (ref 11.7–15.7)
IMM GRANULOCYTES # BLD: 0 10E3/UL
IMM GRANULOCYTES NFR BLD: 0 %
INTERPRETATION ECG - MUSE: NORMAL
IRON SATN MFR SERPL: 12 % (ref 20–55)
IRON SERPL-MCNC: 46 UG/DL (ref 50–212)
LYMPHOCYTES # BLD AUTO: 2.1 10E3/UL (ref 0.8–5.3)
LYMPHOCYTES NFR BLD AUTO: 26 %
MCH RBC QN AUTO: 26.8 PG (ref 26.5–33)
MCHC RBC AUTO-ENTMCNC: 32.9 G/DL (ref 31.5–36.5)
MCV RBC AUTO: 82 FL (ref 78–100)
MONOCYTES # BLD AUTO: 0.9 10E3/UL (ref 0–1.3)
MONOCYTES NFR BLD AUTO: 11 %
NEUTROPHILS # BLD AUTO: 5 10E3/UL (ref 1.6–8.3)
NEUTROPHILS NFR BLD AUTO: 62 %
NRBC # BLD AUTO: 0 10E3/UL
NRBC BLD AUTO-RTO: 0 /100
P AXIS - MUSE: 64 DEGREES
PLATELET # BLD AUTO: 304 10E3/UL (ref 150–450)
POTASSIUM BLD-SCNC: 4.1 MMOL/L (ref 3.5–5.1)
PR INTERVAL - MUSE: 150 MS
PROT SERPL-MCNC: 6.9 G/DL (ref 6.4–8.9)
QRS DURATION - MUSE: 70 MS
QT - MUSE: 322 MS
QTC - MUSE: 439 MS
R AXIS - MUSE: 68 DEGREES
RBC # BLD AUTO: 5.11 10E6/UL (ref 3.8–5.2)
SODIUM SERPL-SCNC: 137 MMOL/L (ref 134–144)
SYSTOLIC BLOOD PRESSURE - MUSE: NORMAL MMHG
T AXIS - MUSE: 54 DEGREES
T4 FREE SERPL-MCNC: 2.95 NG/DL (ref 0.6–1.6)
TIBC SERPL-MCNC: 380.8 UG/DL (ref 245–400)
TRANSFERRIN SERPL-MCNC: 272 MG/DL (ref 203–362)
TROPONIN I SERPL-MCNC: 5.2 PG/ML (ref 0–34)
TSH SERPL DL<=0.005 MIU/L-ACNC: <0.01 MU/L (ref 0.4–4)
UIBC (UNSATURATED): 334.8 MG/DL
VENTRICULAR RATE- MUSE: 112 BPM
WBC # BLD AUTO: 8.2 10E3/UL (ref 4–11)

## 2022-01-19 PROCEDURE — 84445 ASSAY OF TSI GLOBULIN: CPT | Mod: ZL | Performed by: PHYSICIAN ASSISTANT

## 2022-01-19 PROCEDURE — 99214 OFFICE O/P EST MOD 30 MIN: CPT | Performed by: PHYSICIAN ASSISTANT

## 2022-01-19 PROCEDURE — 36415 COLL VENOUS BLD VENIPUNCTURE: CPT | Mod: ZL | Performed by: PHYSICIAN ASSISTANT

## 2022-01-19 PROCEDURE — 86376 MICROSOMAL ANTIBODY EACH: CPT | Mod: ZL | Performed by: PHYSICIAN ASSISTANT

## 2022-01-19 PROCEDURE — 84439 ASSAY OF FREE THYROXINE: CPT | Mod: ZL | Performed by: PHYSICIAN ASSISTANT

## 2022-01-19 PROCEDURE — 84481 FREE ASSAY (FT-3): CPT | Mod: ZL | Performed by: PHYSICIAN ASSISTANT

## 2022-01-19 PROCEDURE — 82728 ASSAY OF FERRITIN: CPT | Mod: ZL | Performed by: PHYSICIAN ASSISTANT

## 2022-01-19 PROCEDURE — 85379 FIBRIN DEGRADATION QUANT: CPT | Mod: ZL | Performed by: PHYSICIAN ASSISTANT

## 2022-01-19 PROCEDURE — 85025 COMPLETE CBC W/AUTO DIFF WBC: CPT | Mod: ZL | Performed by: PHYSICIAN ASSISTANT

## 2022-01-19 PROCEDURE — 84484 ASSAY OF TROPONIN QUANT: CPT | Mod: ZL | Performed by: PHYSICIAN ASSISTANT

## 2022-01-19 PROCEDURE — 84443 ASSAY THYROID STIM HORMONE: CPT | Mod: ZL | Performed by: PHYSICIAN ASSISTANT

## 2022-01-19 PROCEDURE — 80053 COMPREHEN METABOLIC PANEL: CPT | Mod: ZL | Performed by: PHYSICIAN ASSISTANT

## 2022-01-19 PROCEDURE — 83550 IRON BINDING TEST: CPT | Mod: ZL | Performed by: PHYSICIAN ASSISTANT

## 2022-01-19 PROCEDURE — 93000 ELECTROCARDIOGRAM COMPLETE: CPT | Performed by: INTERNAL MEDICINE

## 2022-01-19 ASSESSMENT — PAIN SCALES - GENERAL: PAINLEVEL: NO PAIN (0)

## 2022-01-19 NOTE — NURSING NOTE
Chief Complaint   Patient presents with     Tachycardia     Has noticed at rest her heart rate has been in the 's with watch. With activity reports in the 150's.     Medication Reconciliation: complete    Carlee Telles LPN

## 2022-01-19 NOTE — PATIENT INSTRUCTIONS
Ordered a holter monitor and echocardiogram to rule out concerns.    Referred to cardiology for a consult.     Labs are pending.       Patient Education     Hyperthyroidism    You have hyperthyroidism. This means you have a thyroid gland that makes too much thyroid hormone. This hormone is vital to body growth and metabolism. If you make too much thyroid hormone, many body processes speed up and may not work right. This can cause symptoms throughout the body.  There are a number of causes of hyperthyroidism. The most common cause is Grave s disease. This occurs when the body s immune system causes the thyroid to grow and make more thyroid hormone than needed. Another form of hypothyroidism, called postpartum thyroiditis, occurs shortly after childbirth.  Symptoms of hyperthyroidism include:    Nervousness, anxiety, irritability    Shaking (tremors) that affects the hands and fingers    Weight loss despite having a normal or increased appetite    Low tolerance to heat    Sweating more than normal    Fast or irregular heartbeat    Lighter or irregular periods (women only)    More frequent bowel movements    Enlarged thyroid gland (goiter)    Bulging eyes    Problems sleeping    Muscle weakness    Fatigue    Swelling of the hands, ankles, or feet (older adults only)  Your healthcare provider will need to do some tests to see exactly which form of hypothyroidism you have. This is because the treatments are different. Treatment for hyperthyroidism may include taking medicines. For instance, antithyroid medicines may be prescribed. These help lower the amount of thyroid hormone made by the thyroid gland. Beta-blockers may be prescribed as well. Tips for taking medicines are given below.  Radioiodine ablation or surgery may also be advised. Your healthcare provider will tell you more about these options if needed.  Home care  Tips for taking medicines    Take any medicines you re prescribed as directed.    Take your  medicine at the same times each day.    To decrease the chance of drug interactions, check with your pharmacist before using over-the-counter medicines with your prescribed medicines.    Use a pillbox labeled with the days of the week. This will help you remember to take your medicine each day.    Tell your provider if you have any side effects from your medicines that bother you.    Never stop taking medicines on your own. If you do, your symptoms will return.  General care    Always talk with your provider before trying other medicines or treatments for your thyroid problem.    If you see other healthcare providers, be sure to let them know about your thyroid problem.    Follow-up care  See your healthcare provider for checkups as advised. You may need regular tests to check the level of thyroid hormone in your blood.  When to seek medical advice  Call your healthcare provider right away if any of these occur:    New symptoms occur    Symptoms return, continue, or worsen even after treatment    Extreme fatigue    Puffy hands, face, or feet    Confusion  Call 911  Call 911 if any of these occur:    Fainting    Chest pain    Shortness of breath or trouble breathing  [employer] last reviewed this educational content on 4/1/2018 2000-2021 [employer]. All rights reserved. This information is not intended as a substitute for professional medical care. Always follow your healthcare professional's instructions.           Patient Education     When You Have Hyperthyroidism  You have been diagnosed with hyperthyroidism. This means you have an overactive thyroid gland. Thyroid hormone is important to your body's growth and metabolism. But if you have too much thyroid hormone, your body's processes may speed up or overreact. This can cause many symptoms. Hyperthyroidism is treated with medicines, radiation, or surgery. Below are instructions for self-care and follow-up care.   Taking your medicine    Take your  medicine exactly as directed.     Take your medicine at the same time every day. Keep your pills in a container that is labeled with the days of the week. This will help you know if you ve taken your medicine each day.    Try to take your medicine with the same food or drink each day. This will help you control the amount of thyroid hormone in your body. Take any calcium supplements at least 4 hours before taking or 4 hours after taking your thyroid hormone pill.    Don t stop taking medicine. If you do, your symptoms will come back. Only make changes to your medicine routine as your healthcare provider instructs.    Keep a card in your wallet that says you have hyperthyroidism. Make sure it has:  ? Your name and address  ? Contact information for your provider  ? Names and doses of your medicines    Keeping track of symptoms  During your routine visits, tell your healthcare provider if you have any symptoms of too little thyroid hormone (hypothyroidism). This can be a side effect of treatment. Also tell your provider if you have symptoms of too much thyroid hormone.   Symptoms of too little thyroid hormone include:     Tiredness or low energy    Puffy hands, face, or feet    Hoarseness    Muscle pain    Slow heartbeat (less than 60 beats per minute)    Feeling abnormally cold when others feel comfortable  Symptoms of too much thyroid hormone include:     Restlessness    Fast weight loss    Sweating    Fast heartbeat (more than 100 beats per minute)    Feeling abnormally hot when others feel comfortable    For females, increased menstrual bleeding  Follow-up care  Follow up with your healthcare provider, or as advised. Make and keep appointments to see your provider and have blood tests. You will need regular blood tests for the rest of your life to check your hormone levels.   To learn more  These resources can help you learn more:     [employer]  [phone] www.thyroid.org    [employer]  [facility]  [phone] www.hormone.org  When to call your healthcare provider  Call your healthcare provider right away if you have any of these:     Anxiety, shakiness, or sleeplessness that gets worse    Sore throat while taking medicines to control hyperthyroidism    Fever of 100.4 F ( 38 C) or higher, or as advised by your provider    Feeling sweaty and hot when others around you are comfortable    Shortness of breath    Trouble focusing your eyes or double vision    Bulging eyes    Weight loss for no obvious reason    Fast heartbeat at rest (more than 100 beats per minute)    Enlarged thyroid gland (goiter) that gets larger    Diarrhea  [employer] last reviewed this educational content on 8/1/2019 2000-2021 [employer]. All rights reserved. This information is not intended as a substitute for professional medical care. Always follow your healthcare professional's instructions.

## 2022-01-19 NOTE — PROGRESS NOTES
Nursing Notes:   Carlee Telles LPN  1/19/2022  3:10 PM  Signed  Chief Complaint   Patient presents with     Tachycardia     Has noticed at rest her heart rate has been in the 's with watch. With activity reports in the 150's.     Medication Reconciliation: complete    Carlee Telles LPN        HPI:    Amanda Lockett is a 36 year old female who presents for increased pulse rate.  Patient has a watch that monitors her pulse rate.  She noticed mid November 2021 that her pulse was higher than it normally was.  Does not feel like her heart is racing or skipping beats.  Previously had a pulse in the 70s and 80s.  Currently averaging at rest .  It goes up to 150s.  She has been a little bit more out of breath over the last couple months.  Still able to make it up a flight of stairs without having chest pain or hard time breathing.  No chest pain, palpitations, lightheadedness or dizziness, cough or cold symptoms, fevers, chills, GI or urinary symptoms.  Otherwise feeling fine.  She does note that she obtained her COVID-19 Moderna booster on 11/18/2021.  Unsure if this is related to her tachycardia.  Mother has family history of hypothyroidism.  No family history of cardiac disease.    Answers for HPI/ROS submitted by the patient on 1/16/2022  How many servings of fruits and vegetables do you eat daily?: 2-3  On average, how many sweetened beverages do you drink each day (Examples: soda, juice, sweet tea, etc.  Do NOT count diet or artificially sweetened beverages)?: 0  How many minutes a day do you exercise enough to make your heart beat faster?: 9 or less  How many days a week do you exercise enough to make your heart beat faster?: 3 or less  How many days per week do you miss taking your medication?: 0        Past Medical History:   Diagnosis Date     Benign neoplasm of right breast     8/1/2016,Proven by core needle biopsy 8/16/2016.     Personal history of other diseases of the female genital tract      G5, , s/p VAVD (baby weighed 8#15 oz);  for transverse presentation and fetal macrosomia (weight 9 lbs. 2 oz.), elective repeat , missed ab x 1 with suction D&C, now with another missed ab.       Past Surgical History:   Procedure Laterality Date      SECTION      09/15/2011, for breech presentation      SECTION      2015,Elective repeat      DILATION AND CURETTAGE SUCTION N/A 2018    Procedure: DILATION AND CURETTAGE SUCTION;  Suction Dilation and Curettage.    .;  Surgeon: Shady Wells MD;  Location: [facility] OR     DILATION AND CURETTAGE SUCTION N/A 2020    Procedure: DILATION AND CURETTAGE, UTERUS, USING SUCTION;  Surgeon: Shady Wells MD;  Location: [facility] OR     MYRINGOTOMY, INSERT TUBE, COMBINED      ,PE tubes x 3, last placement when she was in 3rd grade     OTHER SURGICAL HISTORY      ,[id number],ORAL SURGERY       Family History   Problem Relation Age of Onset     Thyroid Disease Mother         Thyroid Disease,hypothyroid     Hypertension Mother         Hypertension     Cancer Maternal Grandfather         Cancer,prostate cancer     Breast Cancer Paternal Grandfather         Cancer-breast,breast cancer in 70s;  of different cancer     Breast Cancer Maternal Aunt         Cancer-breast     Breast Cancer Paternal Aunt         Cancer-breast     Other - See Comments Other         No family history of anesthesia intolerance or bleeding/clotting disorders     Colon Cancer No family hx of         Cancer-colon     Ovarian Cancer No family hx of         Cancer-ovarian     Pancreatic Cancer No family hx of         Cancer-pancreatic       Social History     Tobacco Use     Smoking status: Never Smoker     Smokeless tobacco: Never Used   Substance Use Topics     Alcohol use: No     Alcohol/week: 0.0 standard drinks       Current Outpatient Medications   Medication Sig Dispense Refill     methimazole (TAPAZOLE) 5 MG tablet Take 1 tablet (5  mg) by mouth daily 90 tablet 1     propranolol (INDERAL) 20 MG tablet Take 1 tablet (20 mg) by mouth 3 times daily as needed (tachycardia) 90 tablet 3       Allergies   Allergen Reactions     Chlorhexidine Rash     Chloraprep used prior to Day Surgery       REVIEW OF SYSTEMS:  Refer to HPI.    EXAM:   Vitals:    BP (!) 142/90 (BP Location: Left arm, Patient Position: Sitting, Cuff Size: Adult Regular)   Pulse (!) 130   Temp 99.1  F (37.3  C) (Tympanic)   Resp 16   Wt 60.8 kg (134 lb)   LMP 01/03/2022 (Approximate)   SpO2 98%   Breastfeeding No   BMI 22.30 kg/m      General Appearance: Pleasant, alert, appropriate appearance for age. No acute distress  OroPharynx Exam: Dental hygiene adequate. Normal buccal mucosa. Normal pharynx.  Neck Exam:  Supple, no masses or nodes.  Thyroid Exam: No nodules or enlargement.  Chest/Respiratory Exam: Normal chest wall and respirations. Clear to auscultation.  Cardiovascular Exam: Regular rate and rhythm. S1, S2, no murmur, click, gallop, or rubs.  Gastrointestinal Exam: Soft, non-tender, no masses or organomegaly. Normal BS x 4.  Skin: no rash or abnormalities  Neurologic Exam: Nonfocal, normal gross motor, tone coordination and no tremor.  Psychiatric Exam: Alert and oriented - appropriate affect.    PHQ Depression Screen  PHQ-9 SCORE 8/18/2020   PHQ-9 Total Score 3     Labs:   Results for orders placed or performed in visit on 01/19/22   Comprehensive Metabolic Panel     Status: Abnormal   Result Value Ref Range    Sodium 137 134 - 144 mmol/L    Potassium 4.1 3.5 - 5.1 mmol/L    Chloride 104 98 - 107 mmol/L    Carbon Dioxide (CO2) 24 21 - 31 mmol/L    Anion Gap 9 3 - 14 mmol/L    Urea Nitrogen 15 7 - 25 mg/dL    Creatinine 0.54 (L) 0.60 - 1.20 mg/dL    Calcium 9.9 8.6 - 10.3 mg/dL    Glucose 103 70 - 105 mg/dL    Alkaline Phosphatase 48 34 - 104 U/L    AST 25 13 - 39 U/L    ALT 59 (H) 7 - 52 U/L    Protein Total 6.9 6.4 - 8.9 g/dL    Albumin 4.3 3.5 - 5.7 g/dL     Bilirubin Total 0.3 0.3 - 1.0 mg/dL    GFR Estimate >90 >60 mL/min/1.73m2   TSH Reflex [facility]     Status: Abnormal   Result Value Ref Range    TSH <0.01 (L) 0.40 - 4.00 mU/L   Ferritin     Status: Abnormal   Result Value Ref Range    Ferritin 19 (L) 24 - 336 ng/mL   Iron Binding Panel     Status: Abnormal   Result Value Ref Range    Transferrin 272 203 - 362 mg/dL    Iron 46 (L) 50 - 212 ug/dL    UIBC (Unsaturated) 334.80 mg/dL    Iron Binding Capacity 380.80 245.00 - 400.00 ug/dL    Iron Saturation 12 (L) 20 - 55 %   Troponin I     Status: Normal   Result Value Ref Range    Troponin I 5.2 0.0 - 34.0 pg/mL   D dimer quantitative     Status: Normal   Result Value Ref Range    D-Dimer Quantitative 0.47 0.00 - 0.50 ug/mL FEU    Narrative    This D-dimer assay is intended for use in conjunction with a clinical pretest probability assessment model to exclude pulmonary embolism (PE) and deep venous thrombosis (DVT) in outpatients suspected of PE or DVT. The cut-off value is 0.50 ug/mL FEU.   CBC with platelets and differential     Status: None   Result Value Ref Range    WBC Count 8.2 4.0 - 11.0 10e3/uL    RBC Count 5.11 3.80 - 5.20 10e6/uL    Hemoglobin 13.7 11.7 - 15.7 g/dL    Hematocrit 41.7 35.0 - 47.0 %    MCV 82 78 - 100 fL    MCH 26.8 26.5 - 33.0 pg    MCHC 32.9 31.5 - 36.5 g/dL    RDW 12.9 10.0 - 15.0 %    Platelet Count 304 150 - 450 10e3/uL    % Neutrophils 62 %    % Lymphocytes 26 %    % Monocytes 11 %    % Eosinophils 1 %    % Basophils 0 %    % Immature Granulocytes 0 %    NRBCs per 100 WBC 0 <1 /100    Absolute Neutrophils 5.0 1.6 - 8.3 10e3/uL    Absolute Lymphocytes 2.1 0.8 - 5.3 10e3/uL    Absolute Monocytes 0.9 0.0 - 1.3 10e3/uL    Absolute Eosinophils 0.1 0.0 - 0.7 10e3/uL    Absolute Basophils 0.0 0.0 - 0.2 10e3/uL    Absolute Immature Granulocytes 0.0 <=0.4 10e3/uL    Absolute NRBCs 0.0 10e3/uL   T4 free     Status: Abnormal   Result Value Ref Range    Free T4 2.95 (H) 0.60 - 1.60 ng/dL   EKG 12-lead,  tracing only     Status: None   Result Value Ref Range    Systolic Blood Pressure  mmHg    Diastolic Blood Pressure  mmHg    Ventricular Rate 112 BPM    Atrial Rate 112 BPM    AR Interval 150 ms    QRS Duration 70 ms     ms    QTc 439 ms    P Axis 64 degrees    R AXIS 68 degrees    T Axis 54 degrees    Interpretation ECG       Sinus tachycardia  Otherwise normal ECG  No previous ECGs available  Confirmed by MD PAULSON DARIN ([id number]) on 1/19/2022 8:28:00 PM     CBC and Differential     Status: None    Narrative    The following orders were created for panel order CBC and Differential.  Procedure                               Abnormality         Status                     ---------                               -----------         ------                     CBC with platelets and d...[[id number]]                      Final result                 Please view results for these tests on the individual orders.      ASSESSMENT AND PLAN:      ICD-10-CM    1. Sinus tachycardia  R00.0 CBC and Differential     Comprehensive Metabolic Panel     EKG 12-lead, tracing only     TSH Reflex [facility]     Ferritin     Iron Binding Panel     Troponin I     D dimer quantitative     CBC and Differential     Comprehensive Metabolic Panel     TSH Reflex [facility]     Ferritin     Iron Binding Panel     Troponin I     D dimer quantitative     T4 free     propranolol (INDERAL) 20 MG tablet     CANCELED: Echocardiogram Complete     CANCELED: Leadless EKG Monitor 3 to 7 Days     CANCELED: Adult Cardiology Eval Referral   2. Shortness of breath  R06.02 CBC and Differential     Comprehensive Metabolic Panel     EKG 12-lead, tracing only     TSH Reflex [facility]     Ferritin     Iron Binding Panel     Troponin I     D dimer quantitative     CBC and Differential     Comprehensive Metabolic Panel     TSH Reflex [facility]     Ferritin     Iron Binding Panel     Troponin I     D dimer quantitative     T4 free     CANCELED: D dimer quantitative     CANCELED: Troponin I      CANCELED: Echocardiogram Complete     CANCELED: Leadless EKG Monitor 3 to 7 Days     CANCELED: Adult Cardiology Eval Referral   3. Low TSH level  R79.89 T3, Free     Thyroid stimulating immunoglobulin     Thyroid peroxidase antibody   4. Hyperthyroidism  E05.90 methimazole (TAPAZOLE) 5 MG tablet     Adult Endocrinology  Referral     Thyroid stimulating immunoglobulin     Thyroid peroxidase antibody       Completed an EKG which showed sinus tachycardia.  Final read pending by internal medicine.     Completed labs including CBC, CMP, TSH, ferritin, iron binding panel, troponin, and D-dimer.  Patient had unremarkable CBC, CMP, troponin, and D-dimer.  Iron testing showed decreased ferritin level at 19.  TSH was decreased at less than 0.01 and free T4 was elevated at 2.95.  Completed further thyroid testing blood tests are currently pending.    Discussed symptoms at length.  Patient was started on methimazole 5 mg daily.  She was also given propranolol 20 mg to take 3 times daily as needed for elevated heart rate and symptoms.  Gave warning signs and symptoms.  Patient was referred to endocrinology for consult.  Encourage close follow-up with her PCP in 2 to 4 weeks for medication and symptom monitoring.  Return to clinic or emergency room if symptoms are changing or worsening.    Encourage increasing dietary intake of iron-containing foods along with taking women's daily multivitamin with iron.    Gave warning signs and symptoms.  Return to clinic or emergency room if symptoms are changing or worsening.    30 minutes spent on the date of the encounter doing chart review, history and exam, documentation and further activities as noted above.     Consulted with internal medicine, Dr. Stoddard.     Patient Instructions   Ordered a holter monitor and echocardiogram to rule out concerns.    Referred to cardiology for a consult.     Labs are pending.       Patient Education     Hyperthyroidism    You have  hyperthyroidism. This means you have a thyroid gland that makes too much thyroid hormone. This hormone is vital to body growth and metabolism. If you make too much thyroid hormone, many body processes speed up and may not work right. This can cause symptoms throughout the body.  There are a number of causes of hyperthyroidism. The most common cause is Grave s disease. This occurs when the body s immune system causes the thyroid to grow and make more thyroid hormone than needed. Another form of hypothyroidism, called postpartum thyroiditis, occurs shortly after childbirth.  Symptoms of hyperthyroidism include:    Nervousness, anxiety, irritability    Shaking (tremors) that affects the hands and fingers    Weight loss despite having a normal or increased appetite    Low tolerance to heat    Sweating more than normal    Fast or irregular heartbeat    Lighter or irregular periods (women only)    More frequent bowel movements    Enlarged thyroid gland (goiter)    Bulging eyes    Problems sleeping    Muscle weakness    Fatigue    Swelling of the hands, ankles, or feet (older adults only)  Your healthcare provider will need to do some tests to see exactly which form of hypothyroidism you have. This is because the treatments are different. Treatment for hyperthyroidism may include taking medicines. For instance, antithyroid medicines may be prescribed. These help lower the amount of thyroid hormone made by the thyroid gland. Beta-blockers may be prescribed as well. Tips for taking medicines are given below.  Radioiodine ablation or surgery may also be advised. Your healthcare provider will tell you more about these options if needed.  Home care  Tips for taking medicines    Take any medicines you re prescribed as directed.    Take your medicine at the same times each day.    To decrease the chance of drug interactions, check with your pharmacist before using over-the-counter medicines with your prescribed medicines.    Use  a pillbox labeled with the days of the week. This will help you remember to take your medicine each day.    Tell your provider if you have any side effects from your medicines that bother you.    Never stop taking medicines on your own. If you do, your symptoms will return.  General care    Always talk with your provider before trying other medicines or treatments for your thyroid problem.    If you see other healthcare providers, be sure to let them know about your thyroid problem.    Follow-up care  See your healthcare provider for checkups as advised. You may need regular tests to check the level of thyroid hormone in your blood.  When to seek medical advice  Call your healthcare provider right away if any of these occur:    New symptoms occur    Symptoms return, continue, or worsen even after treatment    Extreme fatigue    Puffy hands, face, or feet    Confusion  Call 911  Call 911 if any of these occur:    Fainting    Chest pain    Shortness of breath or trouble breathing  [employer] last reviewed this educational content on 4/1/2018 2000-2021 [employer]. All rights reserved. This information is not intended as a substitute for professional medical care. Always follow your healthcare professional's instructions.           Patient Education     When You Have Hyperthyroidism  You have been diagnosed with hyperthyroidism. This means you have an overactive thyroid gland. Thyroid hormone is important to your body's growth and metabolism. But if you have too much thyroid hormone, your body's processes may speed up or overreact. This can cause many symptoms. Hyperthyroidism is treated with medicines, radiation, or surgery. Below are instructions for self-care and follow-up care.   Taking your medicine    Take your medicine exactly as directed.     Take your medicine at the same time every day. Keep your pills in a container that is labeled with the days of the week. This will help you know if you ve  taken your medicine each day.    Try to take your medicine with the same food or drink each day. This will help you control the amount of thyroid hormone in your body. Take any calcium supplements at least 4 hours before taking or 4 hours after taking your thyroid hormone pill.    Don t stop taking medicine. If you do, your symptoms will come back. Only make changes to your medicine routine as your healthcare provider instructs.    Keep a card in your wallet that says you have hyperthyroidism. Make sure it has:  ? Your name and address  ? Contact information for your provider  ? Names and doses of your medicines    Keeping track of symptoms  During your routine visits, tell your healthcare provider if you have any symptoms of too little thyroid hormone (hypothyroidism). This can be a side effect of treatment. Also tell your provider if you have symptoms of too much thyroid hormone.   Symptoms of too little thyroid hormone include:     Tiredness or low energy    Puffy hands, face, or feet    Hoarseness    Muscle pain    Slow heartbeat (less than 60 beats per minute)    Feeling abnormally cold when others feel comfortable  Symptoms of too much thyroid hormone include:     Restlessness    Fast weight loss    Sweating    Fast heartbeat (more than 100 beats per minute)    Feeling abnormally hot when others feel comfortable    For females, increased menstrual bleeding  Follow-up care  Follow up with your healthcare provider, or as advised. Make and keep appointments to see your provider and have blood tests. You will need regular blood tests for the rest of your life to check your hormone levels.   To learn more  These resources can help you learn more:     [employer]  [phone] www.thyroid.org    [facility]  [phone] www.hormone.org  When to call your healthcare provider  Call your healthcare provider right away if you have any of these:     Anxiety, shakiness, or sleeplessness that  gets worse    Sore throat while taking medicines to control hyperthyroidism    Fever of 100.4 F ( 38 C) or higher, or as advised by your provider    Feeling sweaty and hot when others around you are comfortable    Shortness of breath    Trouble focusing your eyes or double vision    Bulging eyes    Weight loss for no obvious reason    Fast heartbeat at rest (more than 100 beats per minute)    Enlarged thyroid gland (goiter) that gets larger    Diarrhea  [employer] last reviewed this educational content on 8/1/2019 2000-2021 [employer]. All rights reserved. This information is not intended as a substitute for professional medical care. Always follow your healthcare professional's instructions.                Maris Palmer PA-C ..................1/19/2022 3:07 PM

## 2022-01-20 ENCOUNTER — TELEPHONE (OUTPATIENT)
Dept: FAMILY MEDICINE | Facility: OTHER | Age: 37
End: 2022-01-20
Payer: COMMERCIAL

## 2022-01-20 LAB — T3FREE SERPL-MCNC: 9.6 PG/ML (ref 2.5–3.9)

## 2022-01-20 RX ORDER — METHIMAZOLE 5 MG/1
5 TABLET ORAL DAILY
Qty: 90 TABLET | Refills: 1 | Status: SHIPPED | OUTPATIENT
Start: 2022-01-20 | End: 2022-02-18 | Stop reason: ALTCHOICE

## 2022-01-20 RX ORDER — PROPRANOLOL HYDROCHLORIDE 20 MG/1
20 TABLET ORAL 3 TIMES DAILY PRN
Qty: 90 TABLET | Refills: 3 | Status: SHIPPED | OUTPATIENT
Start: 2022-01-20 | End: 2022-02-18

## 2022-01-21 LAB — THYROPEROXIDASE AB SERPL-ACNC: <10 IU/ML

## 2022-01-28 LAB — TSI SER-ACNC: 4.1 TSI INDEX

## 2022-01-31 PROBLEM — E05.00 GRAVES DISEASE: Status: ACTIVE | Noted: 2022-01-31

## 2022-01-31 PROBLEM — E05.90 HYPERTHYROIDISM: Status: ACTIVE | Noted: 2022-01-31

## 2022-02-03 ENCOUNTER — TRANSFERRED RECORDS (OUTPATIENT)
Dept: HEALTH INFORMATION MANAGEMENT | Facility: OTHER | Age: 37
End: 2022-02-03
Payer: COMMERCIAL

## 2022-02-04 DIAGNOSIS — E05.00 TOXIC DIFFUSE GOITER W/O CRISIS: Primary | ICD-10-CM

## 2022-02-18 ENCOUNTER — OFFICE VISIT (OUTPATIENT)
Dept: FAMILY MEDICINE | Facility: OTHER | Age: 37
End: 2022-02-18
Attending: FAMILY MEDICINE
Payer: COMMERCIAL

## 2022-02-18 VITALS
DIASTOLIC BLOOD PRESSURE: 80 MMHG | SYSTOLIC BLOOD PRESSURE: 126 MMHG | BODY MASS INDEX: 20.62 KG/M2 | TEMPERATURE: 98.7 F | OXYGEN SATURATION: 98 % | HEART RATE: 94 BPM | HEIGHT: 67 IN | WEIGHT: 131.4 LBS | RESPIRATION RATE: 16 BRPM

## 2022-02-18 DIAGNOSIS — Z12.4 SCREENING FOR CERVICAL CANCER: ICD-10-CM

## 2022-02-18 DIAGNOSIS — Z30.011 ENCOUNTER FOR INITIAL PRESCRIPTION OF CONTRACEPTIVE PILLS: ICD-10-CM

## 2022-02-18 DIAGNOSIS — D22.9 SKIN MOLE: ICD-10-CM

## 2022-02-18 DIAGNOSIS — Z13.220 SCREENING FOR LIPID DISORDERS: ICD-10-CM

## 2022-02-18 DIAGNOSIS — Z00.00 HEALTH CARE MAINTENANCE: Primary | ICD-10-CM

## 2022-02-18 DIAGNOSIS — E05.00 GRAVES DISEASE: ICD-10-CM

## 2022-02-18 LAB
ALBUMIN SERPL-MCNC: 4.3 G/DL (ref 3.5–5.7)
ALP SERPL-CCNC: 60 U/L (ref 34–104)
ALT SERPL W P-5'-P-CCNC: 22 U/L (ref 7–52)
AST SERPL W P-5'-P-CCNC: 16 U/L (ref 13–39)
BILIRUB DIRECT SERPL-MCNC: 0.1 MG/DL (ref 0–0.2)
BILIRUB SERPL-MCNC: 0.2 MG/DL (ref 0.3–1)
CHOLEST SERPL-MCNC: 119 MG/DL
FASTING STATUS PATIENT QL REPORTED: NORMAL
HDLC SERPL-MCNC: 32 MG/DL (ref 23–92)
LDLC SERPL CALC-MCNC: 67 MG/DL
NONHDLC SERPL-MCNC: 87 MG/DL
PROT SERPL-MCNC: 6.3 G/DL (ref 6.4–8.9)
T4 FREE SERPL-MCNC: 2.03 NG/DL (ref 0.6–1.6)
TRIGL SERPL-MCNC: 99 MG/DL
TSH SERPL DL<=0.005 MIU/L-ACNC: <0.01 MU/L (ref 0.4–4)

## 2022-02-18 PROCEDURE — G0123 SCREEN CERV/VAG THIN LAYER: HCPCS | Performed by: FAMILY MEDICINE

## 2022-02-18 PROCEDURE — 83520 IMMUNOASSAY QUANT NOS NONAB: CPT | Mod: ZL | Performed by: FAMILY MEDICINE

## 2022-02-18 PROCEDURE — 36415 COLL VENOUS BLD VENIPUNCTURE: CPT | Mod: ZL | Performed by: FAMILY MEDICINE

## 2022-02-18 PROCEDURE — 84439 ASSAY OF FREE THYROXINE: CPT | Mod: ZL | Performed by: FAMILY MEDICINE

## 2022-02-18 PROCEDURE — 99395 PREV VISIT EST AGE 18-39: CPT | Performed by: FAMILY MEDICINE

## 2022-02-18 PROCEDURE — 80061 LIPID PANEL: CPT | Mod: ZL | Performed by: FAMILY MEDICINE

## 2022-02-18 PROCEDURE — 84443 ASSAY THYROID STIM HORMONE: CPT | Mod: ZL | Performed by: FAMILY MEDICINE

## 2022-02-18 PROCEDURE — 80076 HEPATIC FUNCTION PANEL: CPT | Mod: ZL | Performed by: FAMILY MEDICINE

## 2022-02-18 PROCEDURE — 87624 HPV HI-RISK TYP POOLED RSLT: CPT | Mod: ZL | Performed by: FAMILY MEDICINE

## 2022-02-18 RX ORDER — METHIMAZOLE 10 MG/1
TABLET ORAL
COMMUNITY
Start: 2022-02-03 | End: 2022-02-18

## 2022-02-18 RX ORDER — METHIMAZOLE 10 MG/1
10 TABLET ORAL DAILY
COMMUNITY
Start: 2022-02-18

## 2022-02-18 RX ORDER — PROPRANOLOL HCL 60 MG
60 CAPSULE, EXTENDED RELEASE 24HR ORAL DAILY
Qty: 90 CAPSULE | Refills: 3 | Status: SHIPPED | OUTPATIENT
Start: 2022-02-18

## 2022-02-18 ASSESSMENT — ENCOUNTER SYMPTOMS
UNEXPECTED WEIGHT CHANGE: 1
SHORTNESS OF BREATH: 0
CHILLS: 0
COUGH: 0
PALPITATIONS: 0
FEVER: 0

## 2022-02-18 ASSESSMENT — PAIN SCALES - GENERAL: PAINLEVEL: NO PAIN (0)

## 2022-02-18 NOTE — NURSING NOTE
Chief Complaint   Patient presents with     Physical     Would like to discuss her thyroid and complete labs. Was seen by endocrinology Dr. Yu about 2 weeks ago.     Medication Reconciliation: complete    Carlee Telles LPN

## 2022-02-18 NOTE — PROGRESS NOTES
SUBJECTIVE:   Nursing Notes:   Carlee Telles LPN  2/18/2022  3:15 PM  Sign at exiting of workspace  Chief Complaint   Patient presents with     Physical     Would like to discuss her thyroid and complete labs. Was seen by endocrinology Dr. Yu about 2 weeks ago.     Medication Reconciliation: complete    Carlee Telles LPN        Amanda Lockett is a 36 year old female who presents to clinic today for a physical.    Recently was diagnosed with Graves disease.  She is on methimazole 10 mg daily.  Her Endocrinologist wants to have her thyroid labs rechecked and has orders for those on file.  Since she is taking the methimazole, it was recommended that she start on birth control.  She would like a prescription for that.  She is having a hard time remembering to take the propranolol three times a day.  She is often still running a higher heart rate, which is bothersome to her.    Has a couple of moles on her left cheek that have been present since her teen years.  They seem to have gotten a little bigger over the past few months.  No pain, bleeding, irritation.        HPI    I personally reviewed medications/allergies/history listed below:    Patient Active Problem List    Diagnosis Date Noted     Graves disease 01/31/2022     Priority: Medium     Hyperthyroidism 01/31/2022     Priority: Medium     Sinus tachycardia 01/19/2022     Priority: Medium     Missed ab 08/28/2020     Priority: Medium     Added automatically from request for surgery [id number]       Incomplete miscarriage 09/28/2018     Priority: Medium     Fibroadenoma of right breast in female 08/01/2016     Priority: Medium     Overview:   Proven by core needle biopsy 8/16/2016.       Encounter for counseling regarding contraception 11/03/2011     Priority: Medium     Contact dermatitis 09/22/2011     Priority: Medium     Overview:   Secondary to chloro-prep       Past Medical History:   Diagnosis Date     Benign neoplasm of right breast      2016,Proven by core needle biopsy 2016.     Personal history of other diseases of the female genital tract     G5, , s/p VAVD (baby weighed 8#15 oz);  for transverse presentation and fetal macrosomia (weight 9 lbs. 2 oz.), elective repeat , missed ab x 2 with suction D&C      Past Surgical History:   Procedure Laterality Date      SECTION      09/15/2011, for breech presentation      SECTION      2015,Elective repeat      DILATION AND CURETTAGE SUCTION N/A 2018    Procedure: DILATION AND CURETTAGE SUCTION;  Suction Dilation and Curettage.    .;  Surgeon: Shady Wells MD;  Location: [facility] OR     DILATION AND CURETTAGE SUCTION N/A 2020    Procedure: DILATION AND CURETTAGE, UTERUS, USING SUCTION;  Surgeon: Shady Wells MD;  Location: [facility] OR     MYRINGOTOMY, INSERT TUBE, COMBINED      ,PE tubes x 3, last placement when she was in 3rd grade     OTHER SURGICAL HISTORY      ,,ORAL SURGERY     Family History   Problem Relation Age of Onset     Thyroid Disease Mother         Thyroid Disease,hypothyroid     Hypertension Mother         Hypertension     Cancer Maternal Grandfather         Cancer,prostate cancer     Breast Cancer Paternal Grandfather         Cancer-breast,breast cancer in 70s;  of different cancer     Breast Cancer Maternal Aunt         Cancer-breast     Breast Cancer Paternal Aunt         Cancer-breast     Other - See Comments Other         No family history of anesthesia intolerance or bleeding/clotting disorders     Colon Cancer No family hx of         Cancer-colon     Ovarian Cancer No family hx of         Cancer-ovarian     Pancreatic Cancer No family hx of         Cancer-pancreatic     Social History     Tobacco Use     Smoking status: Never Smoker     Smokeless tobacco: Never Used   Substance Use Topics     Alcohol use: No     Alcohol/week: 0.0 standard drinks     Social History     Social History  "Lars    Graduated from [employer] in [place] in December of 2006 with degree in business.  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   to Howard Lockett.    Chris Lockett - Son - 10/20/09;     Randy Lockett - son - 9/15/11;     Alphonse Lockett - son - 1/23/15.      Lydia Rodriguez - Mother.     Current Outpatient Medications   Medication Sig Dispense Refill     methimazole (TAPAZOLE) 10 MG tablet Take 1 tablet (10 mg) by mouth daily       norgestrel-ethinyl estradiol (LO/OVRAL) 0.3-30 MG-MCG tablet Take 1 tablet by mouth daily 84 tablet 3     propranolol ER (INDERAL LA) 60 MG 24 hr capsule Take 1 capsule (60 mg) by mouth daily 90 capsule 3     Allergies   Allergen Reactions     Chlorhexidine Rash     Chloraprep used prior to Day Surgery       Review of Systems   Constitutional: Positive for unexpected weight change. Negative for chills and fever.   Respiratory: Negative for cough and shortness of breath.    Cardiovascular: Negative for palpitations and peripheral edema.        OBJECTIVE:     /80 (BP Location: Right arm, Patient Position: Sitting, Cuff Size: Adult Regular)   Pulse 94   Temp 98.7  F (37.1  C) (Tympanic)   Resp 16   Ht 1.69 m (5' 6.54\")   Wt 59.6 kg (131 lb 6.4 oz)   LMP 01/23/2022 (Approximate)   SpO2 98%   Breastfeeding No   BMI 20.87 kg/m    Body mass index is 20.87 kg/m .  Physical Exam  Constitutional:       General: She is not in acute distress.     Appearance: Normal appearance. She is well-developed. She is not toxic-appearing.   HENT:      Head: Normocephalic.      Right Ear: Tympanic membrane and external ear normal.      Left Ear: Tympanic membrane and external ear normal.      Nose: Nose normal.      Mouth/Throat:      Pharynx: No oropharyngeal exudate.   Eyes:      General:         Right eye: No discharge.         Left eye: No discharge.      Conjunctiva/sclera: Conjunctivae normal.      Pupils: Pupils are equal, round, and reactive to light.   Neck:      " Thyroid: No thyromegaly.      Trachea: No tracheal deviation.   Cardiovascular:      Rate and Rhythm: Normal rate and regular rhythm.      Pulses: Normal pulses.      Heart sounds: Normal heart sounds, S1 normal and S2 normal. No murmur heard.    No friction rub. No gallop. No S3 or S4 sounds.   Pulmonary:      Effort: Pulmonary effort is normal. No respiratory distress.      Breath sounds: Normal breath sounds. No wheezing or rales.      Comments: Breast exam:  No masses palpable bilaterally.  No skin changes, tethering or axillary lymphadenopathy bilaterally.    Abdominal:      General: Bowel sounds are normal. There is no distension.      Palpations: Abdomen is soft. There is no mass.      Tenderness: There is no abdominal tenderness.   Genitourinary:     Comments: Pelvic Exam:  Vulva: No external lesions, normal hair distribution, no adenopathy  Vagina: Moist, pink, no abnormal discharge, well rugated, no lesions  Cervix: Pap smear  obtained.  Cervix is parous, smooth, pink, no visible lesions  Uterus: Normal size, anteverted, non-tender, mobile  Ovaries: No mass, non-tender, mobile  Musculoskeletal:         General: Normal range of motion.      Cervical back: Neck supple.   Lymphadenopathy:      Cervical: No cervical adenopathy.   Skin:     General: Skin is warm and dry.      Findings: No rash.      Comments: On the left side of her cheek anterior to her jawline are 2 small, raised brown moles of about 2-3 mm in diameter.  These have a benign appearance at this time.   Neurological:      Mental Status: She is alert and oriented to person, place, and time.      Motor: No abnormal muscle tone.      Deep Tendon Reflexes: Reflexes are normal and symmetric.   Psychiatric:         Thought Content: Thought content normal.         Judgment: Judgment normal.           PHQ-9 SCORE 8/18/2020   PHQ-9 Total Score 3       PHQ-2 Score:     PHQ-2 ( 1999 Pfizer) 2/17/2022 1/16/2022   Q1: Little interest or pleasure in doing  things 0 0   Q2: Feeling down, depressed or hopeless 0 0   PHQ-2 Score 0 0   PHQ-2 Total Score (12-17 Years)- Positive if 3 or more points; Administer PHQ-A if positive - -   Q1: Little interest or pleasure in doing things Not at all Not at all   Q2: Feeling down, depressed or hopeless Not at all Not at all   PHQ-2 Score 0 0         I personally reviewed results withpatient as listed below:   Diagnostic Test Results:  none     ASSESSMENT/PLAN:       ICD-10-CM    1. Health care maintenance  Z00.00    2. Screening for cervical cancer  Z12.4 Pap Screen Thin Prep with HPV - recommended age 30 - 65 years (select HPV order below)   3. Screening for lipid disorders  Z13.220 Lipid Profile     Lipid Profile   4. Graves disease  E05.00 propranolol ER (INDERAL LA) 60 MG 24 hr capsule   5. Encounter for initial prescription of contraceptive pills  Z30.011 norgestrel-ethinyl estradiol (LO/OVRAL) 0.3-30 MG-MCG tablet   6. Skin mole  D22.9        1.  Pap Smear/HPV completed today.  She is up to date on her covid, flu and tdap vaccines.  2.  See #1.  3.  Non-fasting lipid profile ordered.  4.  Will switch her from three times a day propranolol to Inderal LA 60 mg daily instead to help simplify taking this medication.  She is also on methimazole 10 mg daily as noted above and will have labs drawn today to follow up on her Graves disease.  Managed by Endocrinology.  5.  Start on lo-ovral.  6.  2 moles on her face have a benign appearance at this time.    Eugenia Araujo MD  [facility]

## 2022-02-21 LAB — TSH RECEP AB SER-ACNC: 8.36 IU/L (ref 0–1.75)

## 2022-02-23 LAB
BKR LAB AP GYN ADEQUACY: NORMAL
BKR LAB AP GYN INTERPRETATION: NORMAL
PATH REPORT.COMMENTS IMP SPEC: NORMAL
PATH REPORT.COMMENTS IMP SPEC: NORMAL

## 2022-02-25 LAB
HUMAN PAPILLOMA VIRUS 16 DNA: NEGATIVE
HUMAN PAPILLOMA VIRUS 18 DNA: NEGATIVE
HUMAN PAPILLOMA VIRUS FINAL DIAGNOSIS: NORMAL
HUMAN PAPILLOMA VIRUS OTHER HR: NEGATIVE

## 2022-03-21 ENCOUNTER — LAB (OUTPATIENT)
Dept: LAB | Facility: OTHER | Age: 37
End: 2022-03-21
Payer: COMMERCIAL

## 2022-03-21 DIAGNOSIS — E05.00 THYROTOXICOSIS WITH DIFFUSE GOITER WITHOUT THYROTOXIC CRISIS OR STORM: ICD-10-CM

## 2022-03-21 LAB
T3FREE SERPL-MCNC: 5.8 PG/ML (ref 2.5–3.9)
T4 FREE SERPL-MCNC: 1.81 NG/DL (ref 0.6–1.6)
TSH SERPL DL<=0.005 MIU/L-ACNC: <0.01 MU/L (ref 0.4–4)

## 2022-03-21 PROCEDURE — 84439 ASSAY OF FREE THYROXINE: CPT | Mod: ZL

## 2022-03-21 PROCEDURE — 36415 COLL VENOUS BLD VENIPUNCTURE: CPT | Mod: ZL

## 2022-03-21 PROCEDURE — 84481 FREE ASSAY (FT-3): CPT | Mod: ZL

## 2022-03-21 PROCEDURE — 84443 ASSAY THYROID STIM HORMONE: CPT | Mod: ZL

## 2022-03-22 DIAGNOSIS — E05.00 THYROTOXICOSIS WITH DIFFUSE GOITER AND WITHOUT THYROID STORM: Primary | ICD-10-CM

## 2022-05-13 ENCOUNTER — LAB (OUTPATIENT)
Dept: LAB | Facility: OTHER | Age: 37
End: 2022-05-13
Payer: COMMERCIAL

## 2022-05-13 DIAGNOSIS — E05.00 THYROTOXICOSIS WITH DIFFUSE GOITER AND WITHOUT THYROID STORM: ICD-10-CM

## 2022-05-13 LAB
T4 FREE SERPL-MCNC: 0.72 NG/DL (ref 0.6–1.6)
TSH SERPL DL<=0.005 MIU/L-ACNC: 1.21 MU/L (ref 0.4–4)

## 2022-05-13 PROCEDURE — 36415 COLL VENOUS BLD VENIPUNCTURE: CPT | Mod: ZL

## 2022-05-13 PROCEDURE — 84481 FREE ASSAY (FT-3): CPT | Mod: ZL

## 2022-05-13 PROCEDURE — 84439 ASSAY OF FREE THYROXINE: CPT | Mod: ZL

## 2022-05-13 PROCEDURE — 84443 ASSAY THYROID STIM HORMONE: CPT | Mod: ZL

## 2022-05-14 LAB — T3FREE SERPL-MCNC: 2.4 PG/ML (ref 2.3–4.2)

## 2022-09-01 ENCOUNTER — LAB (OUTPATIENT)
Dept: LAB | Facility: OTHER | Age: 37
End: 2022-09-01
Payer: COMMERCIAL

## 2022-09-01 DIAGNOSIS — E05.00 GRAVES DISEASE: ICD-10-CM

## 2022-09-01 LAB
T4 FREE SERPL-MCNC: 0.73 NG/DL (ref 0.6–1.6)
TSH SERPL DL<=0.005 MIU/L-ACNC: 5.73 MU/L (ref 0.4–4)

## 2022-09-01 PROCEDURE — 36415 COLL VENOUS BLD VENIPUNCTURE: CPT | Mod: ZL

## 2022-09-01 PROCEDURE — 84443 ASSAY THYROID STIM HORMONE: CPT | Mod: ZL

## 2022-09-01 PROCEDURE — 84439 ASSAY OF FREE THYROXINE: CPT | Mod: ZL

## 2022-09-03 ENCOUNTER — HEALTH MAINTENANCE LETTER (OUTPATIENT)
Age: 37
End: 2022-09-03

## 2022-10-25 ENCOUNTER — LAB (OUTPATIENT)
Dept: LAB | Facility: OTHER | Age: 37
End: 2022-10-25
Payer: COMMERCIAL

## 2022-10-25 DIAGNOSIS — E05.00 GRAVES DISEASE: ICD-10-CM

## 2022-10-25 LAB
T4 FREE SERPL-MCNC: 0.86 NG/DL (ref 0.6–1.6)
TSH SERPL DL<=0.005 MIU/L-ACNC: 10.15 MU/L (ref 0.4–4)

## 2022-10-25 PROCEDURE — 84439 ASSAY OF FREE THYROXINE: CPT | Mod: ZL

## 2022-10-25 PROCEDURE — 36415 COLL VENOUS BLD VENIPUNCTURE: CPT | Mod: ZL

## 2022-10-25 PROCEDURE — 84443 ASSAY THYROID STIM HORMONE: CPT | Mod: ZL

## 2022-12-06 ENCOUNTER — LAB (OUTPATIENT)
Dept: LAB | Facility: OTHER | Age: 37
End: 2022-12-06
Payer: COMMERCIAL

## 2022-12-06 DIAGNOSIS — E05.00 THYROTOXICOSIS WITH DIFFUSE GOITER AND WITHOUT THYROID STORM: ICD-10-CM

## 2022-12-06 LAB
T4 FREE SERPL-MCNC: 1.26 NG/DL (ref 0.9–1.7)
TSH SERPL DL<=0.005 MIU/L-ACNC: 4.42 UIU/ML (ref 0.3–4.2)

## 2022-12-06 PROCEDURE — 84445 ASSAY OF TSI GLOBULIN: CPT | Mod: ZL

## 2022-12-06 PROCEDURE — 84439 ASSAY OF FREE THYROXINE: CPT | Mod: ZL

## 2022-12-06 PROCEDURE — 83520 IMMUNOASSAY QUANT NOS NONAB: CPT | Mod: ZL

## 2022-12-06 PROCEDURE — 36415 COLL VENOUS BLD VENIPUNCTURE: CPT | Mod: ZL

## 2022-12-06 PROCEDURE — 84443 ASSAY THYROID STIM HORMONE: CPT | Mod: ZL

## 2022-12-08 LAB — TSH RECEP AB SER-ACNC: <1.1 IU/L (ref 0–1.75)

## 2022-12-09 LAB — TSI SER-ACNC: 1.6 TSI INDEX

## 2023-01-08 ENCOUNTER — OFFICE VISIT (OUTPATIENT)
Dept: FAMILY MEDICINE | Facility: OTHER | Age: 38
End: 2023-01-08
Attending: FAMILY MEDICINE
Payer: COMMERCIAL

## 2023-01-08 VITALS
OXYGEN SATURATION: 97 % | DIASTOLIC BLOOD PRESSURE: 84 MMHG | SYSTOLIC BLOOD PRESSURE: 126 MMHG | BODY MASS INDEX: 23.28 KG/M2 | RESPIRATION RATE: 20 BRPM | WEIGHT: 139.7 LBS | HEIGHT: 65 IN | HEART RATE: 122 BPM | TEMPERATURE: 101.2 F

## 2023-01-08 DIAGNOSIS — J02.9 SORE THROAT: ICD-10-CM

## 2023-01-08 DIAGNOSIS — J39.2 ERYTHEMA OF PHARYNX: ICD-10-CM

## 2023-01-08 DIAGNOSIS — J02.0 ACUTE STREPTOCOCCAL PHARYNGITIS: Primary | ICD-10-CM

## 2023-01-08 LAB — GROUP A STREP BY PCR: DETECTED

## 2023-01-08 PROCEDURE — 96372 THER/PROPH/DIAG INJ SC/IM: CPT

## 2023-01-08 PROCEDURE — 250N000011 HC RX IP 250 OP 636

## 2023-01-08 PROCEDURE — 87651 STREP A DNA AMP PROBE: CPT | Mod: ZL

## 2023-01-08 PROCEDURE — 99213 OFFICE O/P EST LOW 20 MIN: CPT

## 2023-01-08 RX ADMIN — PENICILLIN G BENZATHINE 1.2 MILLION UNITS: 1200000 INJECTION, SUSPENSION INTRAMUSCULAR at 11:09

## 2023-01-08 ASSESSMENT — PAIN SCALES - GENERAL: PAINLEVEL: EXTREME PAIN (8)

## 2023-01-08 NOTE — NURSING NOTE
"Chief Complaint   Patient presents with     Pharyngitis     X 3 days     Fever         Initial /84   Pulse (!) 122   Temp (!) 101.2  F (38.4  C) (Tympanic)   Resp 20   Ht 1.657 m (5' 5.25\")   Wt 63.4 kg (139 lb 11.2 oz)   LMP 12/03/2022   SpO2 97%   Breastfeeding No   BMI 23.07 kg/m   Estimated body mass index is 23.07 kg/m  as calculated from the following:    Height as of this encounter: 1.657 m (5' 5.25\").    Weight as of this encounter: 63.4 kg (139 lb 11.2 oz).         Norma J. Gosselin, LPN   "

## 2023-01-08 NOTE — PROGRESS NOTES
ASSESSMENT/PLAN:    I have reviewed the nursing notes.  I have reviewed the findings, diagnosis, plan and need for follow up with the patient.      1. Acute streptococcal pharyngitis  2. Sore throat  3. Erythema of pharynx  - Group A Streptococcus PCR Throat Swab  - penicillin G benzathine (BICILLIN L-A) injection 1.2 Million Units    Strep test positive. Vitals stable. Patient notified of test results. She would like to have the penicillin IM injection rather than take oral antibiotics. Discussed that she is considered contagious for 24 hours after getting penicillin injection and that she should replace her toothbrush on day 2.     Symptomatic treatment - Encouraged fluids, salt water gargles, honey, elevation, humidifier, sinus rinse/netti pot, lozenges, tea, topical vapor rub, popsicles, rest, etc. May use over-the-counter Tylenol or ibuprofen PRN for pain and fever.     Discussed warning signs/symptoms indicative of need to f/u    Follow up if symptoms persist or worsen or concerns    I explained my diagnostic considerations and recommendations to the patient, who voiced understanding and agreement with the treatment plan. All questions were answered. We discussed potential side effects of any prescribed or recommended therapies, as well as expectations for response to treatments.    David Schilling, NICOL CNP  1/8/2023  9:40 AM    HPI:    Amanda Lockett is a 37 year old female  who presents to [facility] today for concerns of sore throat    URI, x 3 days    Symptoms:  YES: + fevers or chills. Fever, highest reported temperature: 101.2 F  YES: + sore throat/pharyngitis/tonsillitis.   No allergy/URI Symptoms  No muffled sounds/change in hearing  No sensation of fullness in ear(s)  No ringing in ears/tinnitus  No balance changes  No dizziness  No congestion (head/nasal/chest)  No cough/productive cough  YES: + post nasal drip   No headache  No sinus pain/pressure  YES: + myalgias  No otalgia  No  rash  Activity Level Changes: Yes: increased fatigue  Appetite/Liquid Intake Changes: Yes: decreased appetite  Changes to Bowel Habits: No  Changes to Bladder Habits: No  Additional Symptoms to Report: No  History of similar symptoms: No  Prior workup: No    Treatments tried: Tylenol/Ibuprofen, Fluids and Rest    Site of exposure: not known.  Type of exposure: not known    Vaccination status:   - Influenza: not currently immunized  - COVID: immunized    Cardiopulmonary History:  Recent Infections (Pneumonia, etc): No  Smoker: No  Asthma: No  COPD: No  Cystic Fibrosis, Dystrophy (Myotonic, etc.), etc.: No  Cardiac History Including Stroke/Stent Placement/STEMI/STEMI, etc.: No    Other Pertinent History: none    Allergies: reviewed    PCP: Dr. Araujo    Past Medical History:   Diagnosis Date     Benign neoplasm of right breast     2016,Proven by core needle biopsy 2016.     Personal history of other diseases of the female genital tract     G5, , s/p VAVD (baby weighed 8#15 oz);  for transverse presentation and fetal macrosomia (weight 9 lbs. 2 oz.), elective repeat , missed ab x 2 with suction D&C     Past Surgical History:   Procedure Laterality Date      SECTION      09/15/2011, for breech presentation      SECTION      2015,Elective repeat      DILATION AND CURETTAGE SUCTION N/A 2018    Procedure: DILATION AND CURETTAGE SUCTION;  Suction Dilation and Curettage.    .;  Surgeon: Shady Wells MD;  Location:  OR     DILATION AND CURETTAGE SUCTION N/A 2020    Procedure: DILATION AND CURETTAGE, UTERUS, USING SUCTION;  Surgeon: Shady Wells MD;  Location:  OR     MYRINGOTOMY, INSERT TUBE, COMBINED      ,PE tubes x 3, last placement when she was in 3rd grade     OTHER SURGICAL HISTORY      ,25004,ORAL SURGERY     Social History     Tobacco Use     Smoking status: Never     Smokeless tobacco: Never   Substance Use  "Topics     Alcohol use: No     Alcohol/week: 0.0 standard drinks     Current Outpatient Medications   Medication Sig Dispense Refill     methimazole (TAPAZOLE) 10 MG tablet Take 1 tablet (10 mg) by mouth daily       norgestrel-ethinyl estradiol (LO/OVRAL) 0.3-30 MG-MCG tablet Take 1 tablet by mouth daily 84 tablet 3     propranolol ER (INDERAL LA) 60 MG 24 hr capsule Take 1 capsule (60 mg) by mouth daily 90 capsule 3     Allergies   Allergen Reactions     Chlorhexidine Rash     Chloraprep used prior to Day Surgery     Past medical history, past surgical history, current medications and allergies reviewed and accurate to the best of my knowledge.      ROS:  Refer to HPI    /84   Pulse (!) 122   Temp (!) 101.2  F (38.4  C) (Tympanic)   Resp 20   Ht 1.657 m (5' 5.25\")   Wt 63.4 kg (139 lb 11.2 oz)   LMP 12/03/2022   SpO2 97%   Breastfeeding No   BMI 23.07 kg/m      EXAM:  General Appearance: Well appearing 37 year old female, appropriate appearance for age. No acute distress   Ears: Left TM intact, translucent with bony landmarks appreciated, no erythema, no effusion, no bulging, no purulence.  Right TM intact, translucent with bony landmarks appreciated, no erythema, no effusion, no bulging, no purulence.  Left auditory canal clear.  Right auditory canal clear.  Normal external ears, non tender.  Eyes: conjunctivae normal without erythema or irritation, corneas clear, no drainage or crusting, no eyelid swelling, pupils equal   Oropharynx: moist mucous membranes, posterior pharynx with moderate erythema, tonsils symmetric and 2+, moderate erythema, no exudates or petechiae, no post nasal drip seen, no trismus, voice clear.    Sinuses:  No sinus tenderness upon palpation of the frontal or maxillary sinuses  Nose:  Bilateral nares: no erythema, no edema, no drainage or congestion   Neck: supple without adenopathy, tenderness with palpation of throat  Respiratory: normal chest wall and respirations.  Normal " effort.  Clear to auscultation bilaterally, no wheezing, crackles or rhonchi.  No increased work of breathing.  No cough appreciated.  Cardiac: RRR with no murmurs  Musculoskeletal:  Equal movement of bilateral upper extremities.  Equal movement of bilateral lower extremities.  Normal gait.    Dermatological: no rashes noted of exposed skin  Neuro: Alert and oriented to person, place, and time.    Psychological: normal affect, alert, oriented, and pleasant.     Labs:  Results for orders placed or performed in visit on 01/08/23   Group A Streptococcus PCR Throat Swab     Status: Abnormal    Specimen: Throat; Swab   Result Value Ref Range    Group A strep by PCR Detected (A) Not Detected    Narrative    The Xpert Xpress Strep A test, performed on the [employer]  Instrument Systems, is a rapid, qualitative in vitro diagnostic test for the detection of Streptococcus pyogenes (Group A ß-hemolytic Streptococcus, Strep A) in throat swab specimens from patients with signs and symptoms of pharyngitis. The Xpert Xpress Strep A test can be used as an aid in the diagnosis of Group A Streptococcal pharyngitis. The assay is not intended to monitor treatment for Group A Streptococcus infections. The Xpert Xpress Strep A test utilizes an automated real-time polymerase chain reaction (PCR) to detect Streptococcus pyogenes DNA.

## 2023-01-13 DIAGNOSIS — Z30.011 ENCOUNTER FOR INITIAL PRESCRIPTION OF CONTRACEPTIVE PILLS: ICD-10-CM

## 2023-01-17 RX ORDER — NORGESTREL AND ETHINYL ESTRADIOL 0.3-0.03MG
KIT ORAL
Qty: 84 TABLET | Refills: 3 | Status: SHIPPED | OUTPATIENT
Start: 2023-01-17

## 2023-01-17 NOTE — TELEPHONE ENCOUNTER
[facility] in [employer] [place] sent Rx request for the following:      Requested Prescriptions   Pending Prescriptions Disp Refills     LOW-OGESTREL 0.3-30 MG-MCG tablet [Pharmacy Med Name: LOW-OGESTREL-28 TABLET] 84 tablet 3     Sig: TAKE 1 TABLET BY MOUTH EVERY DAY     Last Prescription Date:   2/18/22  Last Fill Qty/Refills:         84, R-3    Last Office Visit:              2/18/22   Future Office visit:           None    Angela Bryant RN on 1/17/2023 at 3:01 PM

## 2023-04-29 ENCOUNTER — HEALTH MAINTENANCE LETTER (OUTPATIENT)
Age: 38
End: 2023-04-29

## 2023-07-13 ENCOUNTER — LAB (OUTPATIENT)
Dept: LAB | Facility: OTHER | Age: 38
End: 2023-07-13
Attending: INTERNAL MEDICINE
Payer: COMMERCIAL

## 2023-07-13 DIAGNOSIS — E05.00 THYROTOXICOSIS WITH DIFFUSE GOITER WITHOUT THYROTOXIC CRISIS OR STORM: ICD-10-CM

## 2023-07-13 LAB — TSH SERPL DL<=0.005 MIU/L-ACNC: 2.8 UIU/ML (ref 0.3–4.2)

## 2023-07-13 PROCEDURE — 36415 COLL VENOUS BLD VENIPUNCTURE: CPT | Mod: ZL

## 2023-07-13 PROCEDURE — 83520 IMMUNOASSAY QUANT NOS NONAB: CPT | Mod: ZL

## 2023-07-13 PROCEDURE — 84443 ASSAY THYROID STIM HORMONE: CPT | Mod: ZL

## 2023-07-13 PROCEDURE — 84445 ASSAY OF TSI GLOBULIN: CPT | Mod: ZL

## 2023-07-14 LAB — TSH RECEP AB SER-ACNC: <1.1 IU/L (ref 0–1.75)

## 2023-07-18 LAB — TSI SER-ACNC: <1 TSI INDEX

## 2023-11-29 ENCOUNTER — LAB (OUTPATIENT)
Dept: LAB | Facility: OTHER | Age: 38
End: 2023-11-29
Attending: INTERNAL MEDICINE
Payer: COMMERCIAL

## 2023-11-29 DIAGNOSIS — E05.00 THYROTOXICOSIS WITH DIFFUSE GOITER WITHOUT THYROTOXIC CRISIS OR STORM: ICD-10-CM

## 2023-11-29 LAB — TSH SERPL DL<=0.005 MIU/L-ACNC: 1.76 UIU/ML (ref 0.3–4.2)

## 2023-11-29 PROCEDURE — 84445 ASSAY OF TSI GLOBULIN: CPT | Mod: ZL

## 2023-11-29 PROCEDURE — 83520 IMMUNOASSAY QUANT NOS NONAB: CPT | Mod: ZL

## 2023-11-29 PROCEDURE — 36415 COLL VENOUS BLD VENIPUNCTURE: CPT | Mod: ZL

## 2023-11-29 PROCEDURE — 84443 ASSAY THYROID STIM HORMONE: CPT | Mod: ZL

## 2023-11-30 LAB — TSH RECEP AB SER-ACNC: <1.1 IU/L (ref 0–1.75)

## 2023-12-01 LAB — TSI SER-ACNC: <1 TSI INDEX

## 2023-12-05 ENCOUNTER — TELEPHONE (OUTPATIENT)
Dept: FAMILY MEDICINE | Facility: OTHER | Age: 38
End: 2023-12-05
Payer: COMMERCIAL

## 2024-03-20 ENCOUNTER — MEDICAL CORRESPONDENCE (OUTPATIENT)
Dept: HEALTH INFORMATION MANAGEMENT | Facility: CLINIC | Age: 39
End: 2024-03-20
Payer: COMMERCIAL

## 2024-07-06 ENCOUNTER — HEALTH MAINTENANCE LETTER (OUTPATIENT)
Age: 39
End: 2024-07-06

## 2024-12-17 ENCOUNTER — OFFICE VISIT (OUTPATIENT)
Dept: FAMILY MEDICINE | Facility: OTHER | Age: 39
End: 2024-12-17
Attending: NURSE PRACTITIONER
Payer: COMMERCIAL

## 2024-12-17 ENCOUNTER — HOSPITAL ENCOUNTER (OUTPATIENT)
Dept: GENERAL RADIOLOGY | Facility: OTHER | Age: 39
Discharge: HOME | End: 2024-12-17
Attending: NURSE PRACTITIONER
Payer: COMMERCIAL

## 2024-12-17 VITALS
OXYGEN SATURATION: 98 % | BODY MASS INDEX: 23.82 KG/M2 | WEIGHT: 143 LBS | SYSTOLIC BLOOD PRESSURE: 112 MMHG | HEIGHT: 65 IN | RESPIRATION RATE: 16 BRPM | TEMPERATURE: 97.9 F | DIASTOLIC BLOOD PRESSURE: 82 MMHG | HEART RATE: 82 BPM

## 2024-12-17 DIAGNOSIS — R05.1 ACUTE COUGH: ICD-10-CM

## 2024-12-17 DIAGNOSIS — J18.9 LINGULAR PNEUMONIA: Primary | ICD-10-CM

## 2024-12-17 DIAGNOSIS — Z20.89 EXPOSURE TO PNEUMONIA: ICD-10-CM

## 2024-12-17 PROCEDURE — 71046 X-RAY EXAM CHEST 2 VIEWS: CPT

## 2024-12-17 RX ORDER — AZITHROMYCIN 250 MG/1
TABLET, FILM COATED ORAL
Qty: 6 TABLET | Refills: 0 | Status: SHIPPED | OUTPATIENT
Start: 2024-12-17 | End: 2024-12-22

## 2024-12-17 ASSESSMENT — PAIN SCALES - GENERAL: PAINLEVEL_OUTOF10: NO PAIN (0)

## 2024-12-17 NOTE — PROGRESS NOTES
ASSESSMENT/PLAN:     I have reviewed the nursing notes.  I have reviewed the findings, diagnosis, plan and need for follow up with the patient.          1. Acute cough  - XR Chest 2 Views    2. Exposure to pneumonia  - XR Chest 2 Views    3. Lingular pneumonia (Primary)  - azithromycin (ZITHROMAX) 250 MG tablet; Take 2 tablets (500 mg) by mouth daily for 1 day, THEN 1 tablet (250 mg) daily for 4 days.  Dispense: 6 tablet; Refill: 0    CXR completed and personally reviewed with possible left lingular infiltrate, radiologist over read:  The lungs there is a partial atelectasis and consolidation involving  the inferior aspects of the lingula.   Lingular pneumonia.   Due to known atypical walking pneumonia in the community will treat with Azithromycin    Symptomatic treatment - Encouraged fluids, honey, elevation, humidifier, lozenges, tea, soup, smoothies, popsicles, topical vapor rub, rest, etc   May use over the counter cough/cold medication PRN  May use over-the-counter Tylenol or ibuprofen PRN    Discussed warning signs/symptoms indicative of need to f/u  Follow up if symptoms persist or worsen or concerns      I explained my diagnostic considerations and recommendations to the patient, who voiced understanding and agreement with the treatment plan. All questions were answered. We discussed potential side effects of any prescribed or recommended therapies, as well as expectations for response to treatments.    Yadira An NP  [facility] [place]      SUBJECTIVE:   Amanda Lockett is a 39 year old female who presents to clinic today for the following health issues:  Cough    HPI  Cough for the past week and a half.  Cough is nonproductive.  Some chest heaviness the past few days with slight shortness of breath.  States her children recently had pneumonia and she would like to be checked.  Fevers initially for a few days up to 101, no fevers the past 4 days.  No sore throat.  No headaches. No  body aches.  No nasal drainage/congestion or sinus pressure.  Feeling fatigued.  Appetite at baseline.   No recent OTC medications        Past Medical History:   Diagnosis Date    Benign neoplasm of right breast     2016,Proven by core needle biopsy 2016.    Personal history of other diseases of the female genital tract     G5, , s/p VAVD (baby weighed 8#15 oz);  for transverse presentation and fetal macrosomia (weight 9 lbs. 2 oz.), elective repeat , missed ab x 2 with suction D&C     Past Surgical History:   Procedure Laterality Date     SECTION      09/15/2011, for breech presentation     SECTION      2015,Elective repeat     DILATION AND CURETTAGE SUCTION N/A 2018    Procedure: DILATION AND CURETTAGE SUCTION;  Suction Dilation and Curettage.    .;  Surgeon: Shady Wells MD;  Location: [facility] OR    DILATION AND CURETTAGE SUCTION N/A 2020    Procedure: DILATION AND CURETTAGE, UTERUS, USING SUCTION;  Surgeon: Shady Wells MD;  Location: [facility] OR    MYRINGOTOMY, INSERT TUBE, COMBINED      ,PE tubes x 3, last placement when she was in 3rd grade    OTHER SURGICAL HISTORY      ,80829,ORAL SURGERY     Social History     Tobacco Use    Smoking status: Never     Passive exposure: Never    Smokeless tobacco: Never   Substance Use Topics    Alcohol use: No     Alcohol/week: 0.0 standard drinks of alcohol     Current Outpatient Medications   Medication Sig Dispense Refill    LOW-OGESTREL 0.3-30 MG-MCG tablet TAKE 1 TABLET BY MOUTH EVERY DAY (Patient not taking: Reported on 2024) 84 tablet 3    methimazole (TAPAZOLE) 10 MG tablet Take 1 tablet (10 mg) by mouth daily (Patient not taking: Reported on 2024)      propranolol ER (INDERAL LA) 60 MG 24 hr capsule Take 1 capsule (60 mg) by mouth daily (Patient not taking: Reported on 2024) 90 capsule 3     Allergies   Allergen Reactions    Chlorhexidine Rash     Chloraprep  "used prior to Day Surgery         Past medical history, past surgical history, current medications and allergies reviewed and accurate to the best of my knowledge.        OBJECTIVE:     /82 (BP Location: Left arm, Patient Position: Chair, Cuff Size: Adult Regular)   Pulse 82   Temp 97.9  F (36.6  C) (Tympanic)   Resp 16   Ht 1.651 m (5' 5\")   Wt 64.9 kg (143 lb)   LMP 12/12/2024 (Exact Date)   SpO2 98%   Breastfeeding No   BMI 23.80 kg/m    Body mass index is 23.8 kg/m .      Physical Exam  General Appearance: Well appearing adult female, appropriate appearance for age. No acute distress  Orophayrnx: moist mucous membranes, pharynx without erythema, tonsils without hypertrophy, tonsils without erythema, no tonsillar exudates, no oral lesions, no palate petechiae, no post nasal drip seen, no trismus, voice clear.    Nose:  No noted drainage or congestion   Neck: supple without adenopathy  Respiratory: normal chest wall and respirations.  Normal effort.  Clear to auscultation bilaterally, no wheezing, crackles or rhonchi.  No increased work of breathing.  No cough appreciated.  Cardiac: RRR with no murmurs  Musculoskeletal:  Equal movement of bilateral upper extremities.  Equal movement of bilateral lower extremities.  Normal gait.    Psychological: normal affect, alert, oriented, and pleasant.     Imaging:  Results for orders placed or performed in visit on 12/17/24   XR Chest 2 Views     Status: None    Narrative    Procedure:XR CHEST 2 VIEWS    Clinical history:Female, 39 years, Acute cough; Exposure to pneumonia    Technique: Two views are submitted.    Comparison: No relevant prior imaging.    Findings: The cardiac silhouette is within normal limits.    The lungs there is a partial atelectasis and consolidation involving  the inferior aspects of the lingula. Bony structures are unremarkable.      Impression    Impression:   Lingular pneumonia.    DHARA BERMUDEZ MD         SYSTEM ID:  [id number] "

## 2024-12-17 NOTE — NURSING NOTE
"Chief Complaint   Patient presents with    Cough     Cough, Fever x 1 Week        Initial /82 (BP Location: Left arm, Patient Position: Chair, Cuff Size: Adult Regular)   Pulse 82   Temp 97.9  F (36.6  C) (Tympanic)   Resp 16   Ht 1.651 m (5' 5\")   Wt 64.9 kg (143 lb)   LMP 12/12/2024 (Exact Date)   SpO2 98%   Breastfeeding No   BMI 23.80 kg/m   Estimated body mass index is 23.8 kg/m  as calculated from the following:    Height as of this encounter: 1.651 m (5' 5\").    Weight as of this encounter: 64.9 kg (143 lb).      Medication Reconciliation: Complete.       Marielena Martinez LPN on 12/17/2024 at 10:16 AM     "

## 2025-03-30 ENCOUNTER — HEALTH MAINTENANCE LETTER (OUTPATIENT)
Age: 40
End: 2025-03-30

## 2025-07-13 ENCOUNTER — HEALTH MAINTENANCE LETTER (OUTPATIENT)
Age: 40
End: 2025-07-13

## 2025-07-18 ENCOUNTER — LAB (OUTPATIENT)
Dept: LAB | Facility: OTHER | Age: 40
End: 2025-07-18
Attending: FAMILY MEDICINE
Payer: COMMERCIAL

## 2025-07-18 DIAGNOSIS — E05.00 THYROTOXICOSIS WITH DIFFUSE GOITER WITHOUT THYROTOXIC CRISIS OR STORM: ICD-10-CM

## 2025-07-18 LAB
T4 FREE SERPL-MCNC: 3.74 NG/DL (ref 0.9–1.7)
TSH SERPL DL<=0.005 MIU/L-ACNC: <0.01 UIU/ML (ref 0.3–4.2)

## 2025-07-18 PROCEDURE — 84443 ASSAY THYROID STIM HORMONE: CPT | Mod: ZL

## 2025-07-18 PROCEDURE — 84480 ASSAY TRIIODOTHYRONINE (T3): CPT | Mod: ZL,XU

## 2025-07-18 PROCEDURE — 36415 COLL VENOUS BLD VENIPUNCTURE: CPT | Mod: ZL

## 2025-07-18 PROCEDURE — 84481 FREE ASSAY (FT-3): CPT | Mod: ZL

## 2025-07-18 PROCEDURE — 84445 ASSAY OF TSI GLOBULIN: CPT | Mod: ZL

## 2025-07-18 PROCEDURE — 84439 ASSAY OF FREE THYROXINE: CPT | Mod: ZL

## 2025-07-19 LAB
T3 SERPL-MCNC: 268 NG/DL (ref 85–202)
T3FREE SERPL-MCNC: 10.6 PG/ML (ref 2–4.4)

## 2025-07-30 LAB — TSI SER-ACNC: 1.9 TSI INDEX

## 2025-08-15 ENCOUNTER — HOSPITAL ENCOUNTER (OUTPATIENT)
Dept: MAMMOGRAPHY | Facility: OTHER | Age: 40
Discharge: HOME | End: 2025-08-15
Attending: FAMILY MEDICINE | Admitting: FAMILY MEDICINE
Payer: COMMERCIAL

## 2025-08-15 DIAGNOSIS — Z12.31 VISIT FOR SCREENING MAMMOGRAM: ICD-10-CM

## 2025-08-15 PROCEDURE — 77067 SCR MAMMO BI INCL CAD: CPT | Mod: 26 | Performed by: STUDENT IN AN ORGANIZED HEALTH CARE EDUCATION/TRAINING PROGRAM

## 2025-08-15 PROCEDURE — 77063 BREAST TOMOSYNTHESIS BI: CPT | Mod: 26 | Performed by: STUDENT IN AN ORGANIZED HEALTH CARE EDUCATION/TRAINING PROGRAM

## 2025-08-15 PROCEDURE — 77063 BREAST TOMOSYNTHESIS BI: CPT

## (undated) DEVICE — PAD CHUX UNDERPAD 30X36" P3036C

## (undated) DEVICE — SU CHROMIC 3-0 SH 27" G122H

## (undated) DEVICE — STRAP STIRRUP W/O SLIP 30187-020

## (undated) DEVICE — PACK LITHOTOMY SBA15LIFCA

## (undated) DEVICE — PREP SKIN SCRUB TRAY 4461A

## (undated) DEVICE — DILATOR CERVICAL OS FINDER TAPER 2-4MMX21.5CM 1176

## (undated) DEVICE — SOL WATER 1500ML

## (undated) DEVICE — TUBING SUCTION BOTTLE ASSEMBLY DISP 20714

## (undated) DEVICE — SUCTION CANNULA UTERINE 08MM CVD  20317

## (undated) DEVICE — PANTIES MESH LG/XLG 2PK 706M2

## (undated) DEVICE — PREP POVIDONE IODINE SWABSTICKS TRIPLE PACK MDS093902A

## (undated) DEVICE — GLOVE PROTEXIS POWDER FREE SMT 7.5  2D72PT75X

## (undated) DEVICE — COVER LIGHT HANDLE LT-F02

## (undated) DEVICE — SUCTION CANNULA UTERINE 09MM CVD  21552

## (undated) DEVICE — TUBING VACUUM COLLECTION 6FT 23116

## (undated) DEVICE — SU CHROMIC 2-0 SH 27" G123H

## (undated) DEVICE — CATH INTERMITTENT CLEAN-CATH FEMALE 14FR 6" VINYL LF 420614

## (undated) DEVICE — SLEEVE COMPRESSION SCD KNEE MED 74022

## (undated) DEVICE — PAD PERI INDIV WRAP 11" 2022A

## (undated) DEVICE — SPECIMEN TRAP VACUUM SUCTION 003984-901

## (undated) DEVICE — DRSG TELFA 3X8" 1238

## (undated) DEVICE — LIGHT HANDLES PLASTIC

## (undated) RX ORDER — FENTANYL CITRATE 50 UG/ML
INJECTION, SOLUTION INTRAMUSCULAR; INTRAVENOUS
Status: DISPENSED
Start: 2018-09-28

## (undated) RX ORDER — PROPOFOL 10 MG/ML
INJECTION, EMULSION INTRAVENOUS
Status: DISPENSED
Start: 2020-08-31

## (undated) RX ORDER — LIDOCAINE HYDROCHLORIDE 20 MG/ML
INJECTION, SOLUTION EPIDURAL; INFILTRATION; INTRACAUDAL; PERINEURAL
Status: DISPENSED
Start: 2018-09-28

## (undated) RX ORDER — ACETAMINOPHEN 325 MG/1
TABLET ORAL
Status: DISPENSED
Start: 2018-09-28

## (undated) RX ORDER — PHENAZOPYRIDINE HYDROCHLORIDE 100 MG/1
TABLET, FILM COATED ORAL
Status: DISPENSED
Start: 2018-09-28

## (undated) RX ORDER — CEFAZOLIN SODIUM 2 G/100ML
INJECTION, SOLUTION INTRAVENOUS
Status: DISPENSED
Start: 2020-08-31

## (undated) RX ORDER — KETOROLAC TROMETHAMINE 30 MG/ML
INJECTION, SOLUTION INTRAMUSCULAR; INTRAVENOUS
Status: DISPENSED
Start: 2020-08-31

## (undated) RX ORDER — PHENYLEPHRINE HCL IN 0.9% NACL 1 MG/10 ML
SYRINGE (ML) INTRAVENOUS
Status: DISPENSED
Start: 2018-09-28

## (undated) RX ORDER — ONDANSETRON 2 MG/ML
INJECTION INTRAMUSCULAR; INTRAVENOUS
Status: DISPENSED
Start: 2020-08-31

## (undated) RX ORDER — ONDANSETRON 2 MG/ML
INJECTION INTRAMUSCULAR; INTRAVENOUS
Status: DISPENSED
Start: 2018-09-28

## (undated) RX ORDER — FENTANYL CITRATE 50 UG/ML
INJECTION, SOLUTION INTRAMUSCULAR; INTRAVENOUS
Status: DISPENSED
Start: 2020-08-31

## (undated) RX ORDER — DEXAMETHASONE SODIUM PHOSPHATE 4 MG/ML
INJECTION, SOLUTION INTRA-ARTICULAR; INTRALESIONAL; INTRAMUSCULAR; INTRAVENOUS; SOFT TISSUE
Status: DISPENSED
Start: 2018-09-28

## (undated) RX ORDER — KETOROLAC TROMETHAMINE 30 MG/ML
INJECTION, SOLUTION INTRAMUSCULAR; INTRAVENOUS
Status: DISPENSED
Start: 2018-09-28

## (undated) RX ORDER — LIDOCAINE HYDROCHLORIDE 20 MG/ML
INJECTION, SOLUTION EPIDURAL; INFILTRATION; INTRACAUDAL; PERINEURAL
Status: DISPENSED
Start: 2020-08-31

## (undated) RX ORDER — OXYTOCIN 10 [USP'U]/ML
INJECTION, SOLUTION INTRAMUSCULAR; INTRAVENOUS
Status: DISPENSED
Start: 2018-09-28